# Patient Record
Sex: FEMALE | Race: WHITE | NOT HISPANIC OR LATINO | Employment: OTHER | URBAN - METROPOLITAN AREA
[De-identification: names, ages, dates, MRNs, and addresses within clinical notes are randomized per-mention and may not be internally consistent; named-entity substitution may affect disease eponyms.]

---

## 2023-03-09 LAB
ALANINE AMINOTRANSFERASE (SGPT) (U/L) IN SER/PLAS: 13 U/L (ref 7–45)
ALBUMIN (G/DL) IN SER/PLAS: 3.8 G/DL (ref 3.4–5)
ALKALINE PHOSPHATASE (U/L) IN SER/PLAS: 90 U/L (ref 33–136)
ANION GAP IN SER/PLAS: 12 MMOL/L (ref 10–20)
APPEARANCE, URINE: ABNORMAL
ASPARTATE AMINOTRANSFERASE (SGOT) (U/L) IN SER/PLAS: 21 U/L (ref 9–39)
BACTERIA, URINE: ABNORMAL /HPF
BASOPHILS (10*3/UL) IN BLOOD BY AUTOMATED COUNT: 0.05 X10E9/L (ref 0–0.1)
BASOPHILS/100 LEUKOCYTES IN BLOOD BY AUTOMATED COUNT: 0.5 % (ref 0–2)
BILIRUBIN TOTAL (MG/DL) IN SER/PLAS: 0.4 MG/DL (ref 0–1.2)
BILIRUBIN, URINE: NEGATIVE
BLOOD, URINE: NEGATIVE
CALCIDIOL (25 OH VITAMIN D3) (NG/ML) IN SER/PLAS: 16 NG/ML
CALCIUM (MG/DL) IN SER/PLAS: 8.7 MG/DL (ref 8.6–10.3)
CARBON DIOXIDE, TOTAL (MMOL/L) IN SER/PLAS: 26 MMOL/L (ref 21–32)
CHLORIDE (MMOL/L) IN SER/PLAS: 104 MMOL/L (ref 98–107)
CHOLESTEROL (MG/DL) IN SER/PLAS: 177 MG/DL (ref 0–199)
CHOLESTEROL IN HDL (MG/DL) IN SER/PLAS: 53.7 MG/DL
CHOLESTEROL/HDL RATIO: 3.3
COLOR, URINE: YELLOW
CREATININE (MG/DL) IN SER/PLAS: 1.71 MG/DL (ref 0.5–1.05)
EOSINOPHILS (10*3/UL) IN BLOOD BY AUTOMATED COUNT: 0.25 X10E9/L (ref 0–0.4)
EOSINOPHILS/100 LEUKOCYTES IN BLOOD BY AUTOMATED COUNT: 2.5 % (ref 0–6)
ERYTHROCYTE DISTRIBUTION WIDTH (RATIO) BY AUTOMATED COUNT: 15 % (ref 11.5–14.5)
ERYTHROCYTE MEAN CORPUSCULAR HEMOGLOBIN CONCENTRATION (G/DL) BY AUTOMATED: 32.7 G/DL (ref 32–36)
ERYTHROCYTE MEAN CORPUSCULAR VOLUME (FL) BY AUTOMATED COUNT: 96 FL (ref 80–100)
ERYTHROCYTES (10*6/UL) IN BLOOD BY AUTOMATED COUNT: 3.95 X10E12/L (ref 4–5.2)
GFR FEMALE: 29 ML/MIN/1.73M2
GLUCOSE (MG/DL) IN SER/PLAS: 95 MG/DL (ref 74–99)
GLUCOSE, URINE: NEGATIVE MG/DL
HEMATOCRIT (%) IN BLOOD BY AUTOMATED COUNT: 37.9 % (ref 36–46)
HEMOGLOBIN (G/DL) IN BLOOD: 12.4 G/DL (ref 12–16)
HYALINE CASTS, URINE: ABNORMAL /LPF
IMMATURE GRANULOCYTES/100 LEUKOCYTES IN BLOOD BY AUTOMATED COUNT: 0.4 % (ref 0–0.9)
KETONES, URINE: NEGATIVE MG/DL
LDL: 86 MG/DL (ref 0–99)
LEUKOCYTE ESTERASE, URINE: ABNORMAL
LEUKOCYTES (10*3/UL) IN BLOOD BY AUTOMATED COUNT: 10.2 X10E9/L (ref 4.4–11.3)
LYMPHOCYTES (10*3/UL) IN BLOOD BY AUTOMATED COUNT: 1.52 X10E9/L (ref 0.8–3)
LYMPHOCYTES/100 LEUKOCYTES IN BLOOD BY AUTOMATED COUNT: 14.9 % (ref 13–44)
MONOCYTES (10*3/UL) IN BLOOD BY AUTOMATED COUNT: 0.85 X10E9/L (ref 0.05–0.8)
MONOCYTES/100 LEUKOCYTES IN BLOOD BY AUTOMATED COUNT: 8.3 % (ref 2–10)
NEUTROPHILS (10*3/UL) IN BLOOD BY AUTOMATED COUNT: 7.48 X10E9/L (ref 1.6–5.5)
NEUTROPHILS/100 LEUKOCYTES IN BLOOD BY AUTOMATED COUNT: 73.4 % (ref 40–80)
NITRITE, URINE: NEGATIVE
PH, URINE: 5 (ref 5–8)
PLATELETS (10*3/UL) IN BLOOD AUTOMATED COUNT: 247 X10E9/L (ref 150–450)
POTASSIUM (MMOL/L) IN SER/PLAS: 4.1 MMOL/L (ref 3.5–5.3)
PROTEIN TOTAL: 5.8 G/DL (ref 6.4–8.2)
PROTEIN, URINE: ABNORMAL MG/DL
RBC, URINE: 1 /HPF (ref 0–5)
RENAL EPITHELIAL CELLS, URINE: 1 /HPF
SODIUM (MMOL/L) IN SER/PLAS: 138 MMOL/L (ref 136–145)
SPECIFIC GRAVITY, URINE: 1.01 (ref 1–1.03)
SQUAMOUS EPITHELIAL CELLS, URINE: 1 /HPF
THYROTROPIN (MIU/L) IN SER/PLAS BY DETECTION LIMIT <= 0.05 MIU/L: 2.55 MIU/L (ref 0.44–3.98)
TRIGLYCERIDE (MG/DL) IN SER/PLAS: 188 MG/DL (ref 0–149)
UREA NITROGEN (MG/DL) IN SER/PLAS: 41 MG/DL (ref 6–23)
UROBILINOGEN, URINE: <2 MG/DL (ref 0–1.9)
VLDL: 38 MG/DL (ref 0–40)
WBC, URINE: 28 /HPF (ref 0–5)

## 2023-06-27 LAB
ALANINE AMINOTRANSFERASE (SGPT) (U/L) IN SER/PLAS: 14 U/L (ref 7–45)
ALBUMIN (G/DL) IN SER/PLAS: 3.7 G/DL (ref 3.4–5)
ALKALINE PHOSPHATASE (U/L) IN SER/PLAS: 88 U/L (ref 33–136)
ANION GAP IN SER/PLAS: 11 MMOL/L (ref 10–20)
ASPARTATE AMINOTRANSFERASE (SGOT) (U/L) IN SER/PLAS: 22 U/L (ref 9–39)
BILIRUBIN TOTAL (MG/DL) IN SER/PLAS: 0.3 MG/DL (ref 0–1.2)
CALCIUM (MG/DL) IN SER/PLAS: 8.1 MG/DL (ref 8.6–10.3)
CARBON DIOXIDE, TOTAL (MMOL/L) IN SER/PLAS: 24 MMOL/L (ref 21–32)
CHLORIDE (MMOL/L) IN SER/PLAS: 105 MMOL/L (ref 98–107)
CREATININE (MG/DL) IN SER/PLAS: 2.01 MG/DL (ref 0.5–1.05)
GFR FEMALE: 24 ML/MIN/1.73M2
GLUCOSE (MG/DL) IN SER/PLAS: 94 MG/DL (ref 74–99)
IRON (UG/DL) IN SER/PLAS: 57 UG/DL (ref 35–150)
IRON BINDING CAPACITY (UG/DL) IN SER/PLAS: 266 UG/DL (ref 240–445)
IRON SATURATION (%) IN SER/PLAS: 21 % (ref 25–45)
MAGNESIUM (MG/DL) IN SER/PLAS: 2.38 MG/DL (ref 1.6–2.4)
POTASSIUM (MMOL/L) IN SER/PLAS: 4.1 MMOL/L (ref 3.5–5.3)
PROTEIN TOTAL: 5.6 G/DL (ref 6.4–8.2)
SODIUM (MMOL/L) IN SER/PLAS: 136 MMOL/L (ref 136–145)
UREA NITROGEN (MG/DL) IN SER/PLAS: 50 MG/DL (ref 6–23)

## 2023-06-28 LAB — PARATHYRIN INTACT (PG/ML) IN SER/PLAS: 70.6 PG/ML (ref 18.5–88)

## 2023-06-29 LAB
COLLECTION DURATION OF URINE: 24 HRS
CREATININE (MG/24HR) IN 24 HOUR URINE: 0.68 G/24H (ref 0.67–1.59)
CREATININE (MG/DL) IN URINE: 45.4 MG/DL (ref 20–320)
PROTEIN (MG/24HR) IN 24 HOUR URINE: 3135 MG/24H (ref 0–149)
PROTEIN URINE: 209 MG/DL
VOLUME OF URINE: 1500 ML

## 2023-06-30 LAB — VITAMIN D 1,25-DIHYDROXY: 29.2 PG/ML (ref 19.9–79.3)

## 2023-10-06 DIAGNOSIS — R19.7 DIARRHEA, UNSPECIFIED TYPE: ICD-10-CM

## 2023-10-06 DIAGNOSIS — I10 PRIMARY HYPERTENSION: Primary | ICD-10-CM

## 2023-10-06 DIAGNOSIS — F32.0 CURRENT MILD EPISODE OF MAJOR DEPRESSIVE DISORDER WITHOUT PRIOR EPISODE (CMS-HCC): ICD-10-CM

## 2023-10-06 RX ORDER — DOXAZOSIN 1 MG/1
1 TABLET ORAL NIGHTLY
COMMUNITY
Start: 2021-06-29 | End: 2023-10-06 | Stop reason: SDUPTHER

## 2023-10-06 RX ORDER — AMLODIPINE BESYLATE 5 MG/1
5 TABLET ORAL DAILY
COMMUNITY
Start: 2022-09-01 | End: 2023-10-06 | Stop reason: SDUPTHER

## 2023-10-06 RX ORDER — ESCITALOPRAM OXALATE 10 MG/1
10 TABLET ORAL DAILY
COMMUNITY
Start: 2023-02-23 | End: 2023-10-06 | Stop reason: SDUPTHER

## 2023-10-06 NOTE — TELEPHONE ENCOUNTER
Pt called requesting refills on pended meds.  Pt has no upcoming OV scheduled.  Pt states she is going out of town in 2 weeks and only has 2 week supply of meds left.  OK for refill? Thanks, CG

## 2023-10-11 RX ORDER — METOPROLOL SUCCINATE 25 MG/1
25 TABLET, EXTENDED RELEASE ORAL DAILY
Qty: 100 TABLET | Refills: 1 | Status: SHIPPED | OUTPATIENT
Start: 2023-10-11 | End: 2023-11-20 | Stop reason: ALTCHOICE

## 2023-10-11 RX ORDER — CHOLESTYRAMINE 4 G/9G
1 POWDER, FOR SUSPENSION ORAL DAILY
Qty: 100 PACKET | Refills: 1 | Status: SHIPPED | OUTPATIENT
Start: 2023-10-11 | End: 2023-11-28 | Stop reason: WASHOUT

## 2023-10-11 RX ORDER — DOXAZOSIN 1 MG/1
1 TABLET ORAL NIGHTLY
Qty: 100 TABLET | Refills: 1 | Status: SHIPPED | OUTPATIENT
Start: 2023-10-11 | End: 2024-04-28

## 2023-10-11 RX ORDER — FUROSEMIDE 20 MG/1
20 TABLET ORAL DAILY
COMMUNITY
Start: 2023-10-03 | End: 2023-11-20 | Stop reason: ALTCHOICE

## 2023-10-11 RX ORDER — ESCITALOPRAM OXALATE 10 MG/1
10 TABLET ORAL DAILY
Qty: 100 TABLET | Refills: 1 | Status: SHIPPED | OUTPATIENT
Start: 2023-10-11 | End: 2024-02-23

## 2023-10-11 RX ORDER — METOPROLOL SUCCINATE 25 MG/1
25 TABLET, EXTENDED RELEASE ORAL DAILY
COMMUNITY
Start: 2023-09-01 | End: 2023-10-11 | Stop reason: SDUPTHER

## 2023-10-11 RX ORDER — CHOLESTYRAMINE 4 G/9G
1 POWDER, FOR SUSPENSION ORAL DAILY
COMMUNITY
End: 2023-10-11 | Stop reason: SDUPTHER

## 2023-10-11 RX ORDER — AMLODIPINE BESYLATE 5 MG/1
5 TABLET ORAL DAILY
Qty: 100 TABLET | Refills: 1 | Status: SHIPPED | OUTPATIENT
Start: 2023-10-11 | End: 2023-11-20 | Stop reason: ALTCHOICE

## 2023-10-11 RX ORDER — VALSARTAN AND HYDROCHLOROTHIAZIDE 320; 25 MG/1; MG/1
1 TABLET, FILM COATED ORAL DAILY
COMMUNITY
Start: 2023-07-29 | End: 2023-10-11 | Stop reason: SDUPTHER

## 2023-10-11 RX ORDER — HYDRALAZINE HYDROCHLORIDE 25 MG/1
25 TABLET, FILM COATED ORAL 3 TIMES DAILY
Qty: 300 TABLET | Refills: 1 | Status: SHIPPED | OUTPATIENT
Start: 2023-10-11 | End: 2023-11-20 | Stop reason: ALTCHOICE

## 2023-10-11 RX ORDER — HYDRALAZINE HYDROCHLORIDE 25 MG/1
25 TABLET, FILM COATED ORAL 3 TIMES DAILY
COMMUNITY
Start: 2022-07-19 | End: 2023-10-11 | Stop reason: SDUPTHER

## 2023-10-11 RX ORDER — VALSARTAN AND HYDROCHLOROTHIAZIDE 320; 25 MG/1; MG/1
1 TABLET, FILM COATED ORAL DAILY
Qty: 100 TABLET | Refills: 1 | Status: SHIPPED | OUTPATIENT
Start: 2023-10-11 | End: 2023-11-20 | Stop reason: ALTCHOICE

## 2023-11-07 ENCOUNTER — TELEPHONE (OUTPATIENT)
Dept: PRIMARY CARE | Facility: CLINIC | Age: 83
End: 2023-11-07
Payer: MEDICARE

## 2023-11-07 NOTE — TELEPHONE ENCOUNTER
PATIENT HAD APPOINTMENT TODAY WITH NEPHROLOGIST DR. GOLDBERG AND WAS TOLD TO ASK YOU IF YOU COULD CALL [facility] FOR A DIRECT ADMISSION FOR A RIGHT KIDNEY BIOPSY.   SHE SAYS SHE TRIED GOING THRU ER BUT THEY COULD NOT REGULATE HER BLOOD PRESSURE SO SHE NEEDS A DIRECT ADMISSION.

## 2023-11-20 ENCOUNTER — OFFICE VISIT (OUTPATIENT)
Dept: PRIMARY CARE | Facility: CLINIC | Age: 83
End: 2023-11-20
Payer: MEDICARE

## 2023-11-20 VITALS
SYSTOLIC BLOOD PRESSURE: 126 MMHG | DIASTOLIC BLOOD PRESSURE: 80 MMHG | HEIGHT: 65 IN | WEIGHT: 156 LBS | HEART RATE: 68 BPM | BODY MASS INDEX: 25.99 KG/M2

## 2023-11-20 DIAGNOSIS — I12.9 CKD STAGE 4 SECONDARY TO HYPERTENSION (MULTI): Primary | ICD-10-CM

## 2023-11-20 DIAGNOSIS — C7A.019 MALIGNANT CARCINOID TUMOR OF SMALL INTESTINE, UNSPECIFIED LOCATION (MULTI): ICD-10-CM

## 2023-11-20 DIAGNOSIS — N18.4 CKD STAGE 4 SECONDARY TO HYPERTENSION (MULTI): Primary | ICD-10-CM

## 2023-11-20 DIAGNOSIS — I10 BENIGN ESSENTIAL HYPERTENSION: ICD-10-CM

## 2023-11-20 PROCEDURE — 1036F TOBACCO NON-USER: CPT | Performed by: INTERNAL MEDICINE

## 2023-11-20 PROCEDURE — 1159F MED LIST DOCD IN RCRD: CPT | Performed by: INTERNAL MEDICINE

## 2023-11-20 PROCEDURE — 3074F SYST BP LT 130 MM HG: CPT | Performed by: INTERNAL MEDICINE

## 2023-11-20 PROCEDURE — 99214 OFFICE O/P EST MOD 30 MIN: CPT | Performed by: INTERNAL MEDICINE

## 2023-11-20 PROCEDURE — 3079F DIAST BP 80-89 MM HG: CPT | Performed by: INTERNAL MEDICINE

## 2023-11-20 RX ORDER — ACETAMINOPHEN 325 MG/1
650 TABLET ORAL EVERY 6 HOURS PRN
COMMUNITY
Start: 2023-11-10 | End: 2023-11-20

## 2023-11-20 RX ORDER — OCTREOTIDE ACETATE 50 UG/ML
50 INJECTION, SOLUTION INTRAVENOUS; SUBCUTANEOUS
COMMUNITY

## 2023-11-20 RX ORDER — DAPAGLIFLOZIN 5 MG/1
5 TABLET, FILM COATED ORAL DAILY
COMMUNITY

## 2023-11-20 RX ORDER — NIFEDIPINE 60 MG/1
60 TABLET, FILM COATED, EXTENDED RELEASE ORAL
COMMUNITY
End: 2023-11-28 | Stop reason: WASHOUT

## 2023-11-20 RX ORDER — VALSARTAN 80 MG/1
80 TABLET ORAL 2 TIMES DAILY
COMMUNITY
Start: 2023-11-10 | End: 2023-12-10

## 2023-11-20 RX ORDER — CLONIDINE HYDROCHLORIDE 0.1 MG/1
0.1 TABLET ORAL AS NEEDED
COMMUNITY

## 2023-11-20 RX ORDER — DAPAGLIFLOZIN 10 MG/1
TABLET, FILM COATED ORAL
COMMUNITY
End: 2023-11-20 | Stop reason: ALTCHOICE

## 2023-11-20 NOTE — PROGRESS NOTES
Primary Care Physician: Vance Bagley MD    Date of Visit: 11/20/2023      Summary:   Missy Kirk is a 83 y.o. female presents hsopital f/up    Chief Complaint:   Chief Complaint   Patient presents with    Hospital Follow-up         HPI:  HPI  Pt has ckd.  Referred to nephro.  Tried to do a renal bx, but, her bp kept going up.    She was admitted to the hospital for a more controlled setting.  Post disch,  she is on a renal diet (not much that she can have that she actually likes to eat) and checking bp 4x/day!    Many medication changes  Results of the bx are not back yet.     Dwp and grand-daughter  Recommended that she have a no added salt (ie, season food when cooking, but try not to add after)  There are foods that have high potassium.  She can eat them in moderation   Stop checking bp so much.  Once a day is more than enough.  A couple of times a week would be better.   Past Medical History:  Past Medical History:   Diagnosis Date    Acute upper respiratory infection, unspecified     Upper respiratory infection of multiple sites    Chronic maxillary sinusitis     Sinusitis, maxillary, chronic    Other specified disorders of bone, unspecified site     Exostosis    Other specified postprocedural states     History of cardiac cath    Personal history of diseases of the skin and subcutaneous tissue 04/16/2015    History of cyst of breast    Personal history of other diseases of the musculoskeletal system and connective tissue     History of myositis    Personal history of other diseases of the nervous system and sense organs     History of impacted cerumen    Personal history of other diseases of the respiratory system     History of acute pharyngitis    Personal history of other specified conditions     History of abnormal weight loss    Personal history of pneumonia (recurrent) 01/09/2018    History of pneumonia    Personal history of urinary (tract) infections     History of urinary tract infection     "Right upper quadrant pain     Abdominal pain, RUQ (right upper quadrant)    Sprain of ligaments of thoracic spine, initial encounter     Thoracic sprain and strain    Unspecified abnormal findings in urine     Nonspecific findings on examination of urine        Social History:   reports that she has never smoked. She has never been exposed to tobacco smoke. She has never used smokeless tobacco. She reports that she does not currently use alcohol. She reports that she does not use drugs.     Family History:  family history is not on file.      Allergies:  Allergies   Allergen Reactions    Codeine Unknown     GI upset    Fentanyl Unknown and Other    Oxycodone Unknown    Verapamil Unknown       Outpatient Medications:  Current Outpatient Medications   Medication Instructions    cholestyramine (Questran) 4 gram packet 4 g, oral, Daily    cloNIDine (CATAPRES) 0.1 mg, oral, As needed, Take if BP exceeds 160    dapagliflozin propanediol (FARXIGA) 5 mg, oral, Daily    doxazosin (CARDURA) 1 mg, oral, Nightly    escitalopram (LEXAPRO) 10 mg, oral, Daily    NIFEdipine ER (ADALAT CC) 60 mg, oral, Once daily (morning) M-F (5 days a week), Take at noon <BR>Do not crush, chew, or split.    SandoSTATIN 50 mcg, subcutaneous, Every 30 days    valsartan (DIOVAN) 80 mg, oral, 2 times daily         ROS:   Review of Systems   All other systems reviewed and are negative.         Vitals:    11/20/23 0848   BP: 126/80   BP Location: Left arm   Patient Position: Sitting   BP Cuff Size: Adult   Pulse: 68   Weight: 70.8 kg (156 lb)   Height: 1.651 m (5' 5\")     Wt Readings from Last 1 Encounters:   11/20/23 70.8 kg (156 lb)     Body mass index is 25.96 kg/m².       Physical Exam:  Physical Exam  HENT:      Head: Normocephalic and atraumatic.      Nose: Nose normal.      Mouth/Throat:      Mouth: Mucous membranes are moist.   Eyes:      Conjunctiva/sclera: Conjunctivae normal.      Pupils: Pupils are equal, round, and reactive to light. "   Cardiovascular:      Rate and Rhythm: Normal rate and regular rhythm.      Heart sounds: Normal heart sounds.   Pulmonary:      Effort: Pulmonary effort is normal.      Breath sounds: Normal breath sounds.   Abdominal:      General: Bowel sounds are normal.      Palpations: Abdomen is soft.   Musculoskeletal:      Cervical back: Neck supple.      Right lower leg: No edema.      Left lower leg: No edema.   Neurological:      Mental Status: She is alert.               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       Followup Appts:  No future appointments.        ____________________________________________________________  Vance Bagley MD

## 2023-11-21 NOTE — PROGRESS NOTES
Subjective   Patient ID: Missy Kirk is a 83 y.o. female who presents for No chief complaint on file..  HPI    Review of Systems    Objective   Physical Exam    Assessment/Plan   {Assess/PlanSmartLinks:12747}

## 2023-11-28 PROBLEM — F41.1 GENERALIZED ANXIETY DISORDER: Status: ACTIVE | Noted: 2023-11-28

## 2023-11-28 PROBLEM — I10 HTN (HYPERTENSION), MALIGNANT: Status: ACTIVE | Noted: 2023-11-09

## 2023-11-28 PROBLEM — I12.9 CKD STAGE 4 SECONDARY TO HYPERTENSION (MULTI): Status: ACTIVE | Noted: 2023-11-08

## 2023-11-28 PROBLEM — I10 BENIGN ESSENTIAL HYPERTENSION: Status: ACTIVE | Noted: 2020-12-17

## 2023-11-28 PROBLEM — R19.7 DIARRHEA: Status: ACTIVE | Noted: 2023-11-28

## 2023-11-28 PROBLEM — R53.83 FATIGUE: Status: RESOLVED | Noted: 2023-11-28 | Resolved: 2023-11-28

## 2023-11-28 PROBLEM — E78.5 HYPERLIPIDEMIA: Status: ACTIVE | Noted: 2023-11-28

## 2023-11-28 PROBLEM — D64.9 ANEMIA: Status: ACTIVE | Noted: 2023-11-08

## 2023-11-28 PROBLEM — M85.80 OSTEOPENIA: Status: ACTIVE | Noted: 2023-11-28

## 2023-11-28 PROBLEM — A04.72 C. DIFFICILE DIARRHEA: Status: RESOLVED | Noted: 2023-11-28 | Resolved: 2023-11-28

## 2023-11-28 PROBLEM — R32 URINARY INCONTINENCE: Status: ACTIVE | Noted: 2023-11-28

## 2023-11-28 PROBLEM — C7A.019: Status: ACTIVE | Noted: 2023-11-28

## 2023-11-28 PROBLEM — R10.84 GENERALIZED ABDOMINAL PAIN: Status: RESOLVED | Noted: 2020-12-17 | Resolved: 2023-11-28

## 2023-11-28 PROBLEM — N18.4 CKD STAGE 4 SECONDARY TO HYPERTENSION (MULTI): Status: ACTIVE | Noted: 2023-11-08

## 2023-11-28 PROBLEM — K58.0 IRRITABLE BOWEL SYNDROME WITH DIARRHEA: Status: ACTIVE | Noted: 2023-11-28

## 2023-11-28 PROBLEM — N32.81 OVERACTIVE BLADDER: Status: ACTIVE | Noted: 2020-12-17

## 2023-11-28 RX ORDER — CARVEDILOL 12.5 MG/1
12.5 TABLET ORAL
COMMUNITY

## 2023-11-28 RX ORDER — SPIRONOLACTONE 25 MG/1
25 TABLET ORAL DAILY
COMMUNITY

## 2023-11-28 RX ORDER — NIFEDIPINE 90 MG/1
90 TABLET, EXTENDED RELEASE ORAL DAILY
COMMUNITY

## 2023-11-28 RX ORDER — HYDRALAZINE HYDROCHLORIDE 100 MG/1
100 TABLET, FILM COATED ORAL 3 TIMES DAILY
COMMUNITY

## 2023-11-28 RX ORDER — FUROSEMIDE 20 MG/1
20 TABLET ORAL DAILY
COMMUNITY

## 2023-11-28 RX ORDER — SODIUM BICARBONATE 650 MG/1
650 TABLET ORAL 3 TIMES DAILY
COMMUNITY

## 2024-02-17 DIAGNOSIS — F32.0 CURRENT MILD EPISODE OF MAJOR DEPRESSIVE DISORDER WITHOUT PRIOR EPISODE (CMS-HCC): ICD-10-CM

## 2024-02-23 RX ORDER — ESCITALOPRAM OXALATE 10 MG/1
10 TABLET ORAL DAILY
Qty: 100 TABLET | Refills: 1 | Status: SHIPPED | OUTPATIENT
Start: 2024-02-23 | End: 2024-09-10

## 2024-05-01 ENCOUNTER — OFFICE VISIT (OUTPATIENT)
Dept: PODIATRY | Facility: CLINIC | Age: 84
End: 2024-05-01
Payer: MEDICARE

## 2024-05-01 DIAGNOSIS — M79.674 TOE PAIN, RIGHT: ICD-10-CM

## 2024-05-01 DIAGNOSIS — B35.1 TINEA UNGUIUM: Primary | ICD-10-CM

## 2024-05-01 DIAGNOSIS — M79.675 TOE PAIN, LEFT: ICD-10-CM

## 2024-05-01 PROCEDURE — 1157F ADVNC CARE PLAN IN RCRD: CPT | Performed by: PODIATRIST

## 2024-05-01 PROCEDURE — 11721 DEBRIDE NAIL 6 OR MORE: CPT | Performed by: PODIATRIST

## 2024-05-02 NOTE — PROGRESS NOTES
CC:  painful thickened and elongated toenails    HPI:  This presents complaining  painful thickened and elongated toenails that are difficult to manage.  Onset was gradual with worsening course until recently.  Aggravated by shoe gear and ambulation.       PCP: Dr. Bagley  Last visit: 4-3-24     PMH  Past Medical History:   Diagnosis Date    Acute upper respiratory infection, unspecified     Upper respiratory infection of multiple sites    C. difficile diarrhea 11/28/2023    Chronic maxillary sinusitis     Sinusitis, maxillary, chronic    Fatigue 11/28/2023    Generalized abdominal pain 12/17/2020    Other specified disorders of bone, unspecified site     Exostosis    Other specified postprocedural states     History of cardiac cath    Personal history of diseases of the skin and subcutaneous tissue 04/16/2015    History of cyst of breast    Personal history of other diseases of the musculoskeletal system and connective tissue     History of myositis    Personal history of other diseases of the nervous system and sense organs     History of impacted cerumen    Personal history of other diseases of the respiratory system     History of acute pharyngitis    Personal history of other specified conditions     History of abnormal weight loss    Personal history of pneumonia (recurrent) 01/09/2018    History of pneumonia    Personal history of urinary (tract) infections     History of urinary tract infection    Right upper quadrant pain     Abdominal pain, RUQ (right upper quadrant)    Sprain of ligaments of thoracic spine, initial encounter     Thoracic sprain and strain    Unspecified abnormal findings in urine     Nonspecific findings on examination of urine     MEDS    Current Outpatient Medications:     carvedilol (Coreg) 12.5 mg tablet, Take 1 tablet (12.5 mg) by mouth 2 times a day with meals., Disp: , Rfl:     cloNIDine (Catapres) 0.1 mg tablet, Take 1 tablet (0.1 mg) by mouth if needed for high blood pressure.  Take if BP exceeds 160, Disp: , Rfl:     dapagliflozin propanediol (Farxiga) 5 mg, Take 1 tablet (5 mg) by mouth once daily., Disp: , Rfl:     doxazosin (Cardura) 1 mg tablet, Take 1 tablet (1 mg) by mouth once daily at bedtime., Disp: 100 tablet, Rfl: 1    escitalopram (Lexapro) 10 mg tablet, Take 1 tablet (10 mg) by mouth once daily., Disp: 100 tablet, Rfl: 1    furosemide (Lasix) 20 mg tablet, Take 1 tablet (20 mg) by mouth once daily., Disp: , Rfl:     hydrALAZINE (Apresoline) 100 mg tablet, Take 1 tablet (100 mg) by mouth 3 times a day., Disp: , Rfl:     NIFEdipine ER (Adalat CC) 90 mg 24 hr tablet, Take 1 tablet (90 mg) by mouth once daily. Do not crush, chew, or split., Disp: , Rfl:     octreotide (SandoSTATIN) 50 mcg/mL solution injection, Inject 1 mL (50 mcg) under the skin every 30 (thirty) days., Disp: , Rfl:     sodium bicarbonate 650 mg tablet, Take 1 tablet (650 mg) by mouth 3 times a day., Disp: , Rfl:     spironolactone (Aldactone) 25 mg tablet, Take 1 tablet (25 mg) by mouth once daily., Disp: , Rfl:     valsartan (Diovan) 80 mg tablet, Take 1 tablet (80 mg) by mouth twice a day., Disp: , Rfl:   Allergies  Allergies   Allergen Reactions    Codeine Unknown     GI upset    Fentanyl Unknown and Other    Oxycodone Unknown    Verapamil Unknown     Social History     Socioeconomic History    Marital status:      Spouse name: Not on file    Number of children: Not on file    Years of education: Not on file    Highest education level: Not on file   Occupational History    Not on file   Tobacco Use    Smoking status: Never     Passive exposure: Never    Smokeless tobacco: Never   Substance and Sexual Activity    Alcohol use: Not Currently    Drug use: Never    Sexual activity: Not on file   Other Topics Concern    Not on file   Social History Narrative    Not on file     Social Determinants of Health     Financial Resource Strain: Low Risk  (11/9/2023)    Received from [facility]    Overall  Financial Resource Strain (CARDIA)     Difficulty of Paying Living Expenses: Not hard at all   Food Insecurity: No Food Insecurity (11/9/2023)    Received from [facility]    Hunger Vital Sign     Worried About Running Out of Food in the Last Year: Never true     Ran Out of Food in the Last Year: Never true   Transportation Needs: Unknown (11/9/2023)    Received from [facility]    PRAPARE - Transportation     Lack of Transportation (Medical): No     Lack of Transportation (Non-Medical): Not on file   Physical Activity: Not on file   Stress: Not on file   Social Connections: Not on file   Intimate Partner Violence: Not on file   Housing Stability: Unknown (11/9/2023)    Received from [facility]    Housing Stability Vital Sign     Unable to Pay for Housing in the Last Year: No     Number of Places Lived in the Last Year: Not on file     Unstable Housing in the Last Year: No     No family history on file.  Past Surgical History:   Procedure Laterality Date    APPENDECTOMY  04/15/2015    Appendectomy    CT GUIDED CHEST TUBE PLACEMENT  11/21/2017    CT GUIDED CHEST TUBE PLACEMENT 11/21/2017 [facility]    TONSILLECTOMY  04/15/2015    Tonsillectomy    US GUIDED THYROID BIOPSY  3/25/2022    US GUIDED THYROID BIOPSY 3/25/2022 [facility]       REVIEW OF SYSTEMS    DERM:   + as noted in HPI.       Physical examination:   On General Observation: Patient is a pleasant, cooperative, well developed 83 y.o.  adult female. The patient is alert and oriented to time, place and person. Patient has normal affect and mood.  There were no vitals taken for this visit.    Vascular:  DP and PT pulses are 1-2/4 b/l.  mild edema noted. mild varicosities b/l.  CFT  6 seconds to all digits bilateral.  Skin temperature is warm to warm from proximal to distal bilateral.      Muscular: Strength is 5/5 for all instrinsic and extrinsic muscle groups.     Neuro:  Proprioception present.   Sensation to vibration is   present. Protective sensation present  at all pedal sites via [place] 5.07 monofilament bilateral.  Light touch intact bilateral.     Derm:    Decreased hair growth b/l le  Left toenails: 1-5 Brittleness, crumbling upon debridement, subungual debris, elongation, mycotic appearance, tenderness, and thickness.   Right toenails: 1-5 Brittleness, crumbling upon debridement, subungual debris, elongation, mycotic appearance, tenderness, and thickness.       ASSESSMENT:    Tinea Unguium [B35.1]   Pain in right toe(s) [M79.674]   Pain in left toe(s) [M79.675]       PLAN:   A comprehensive history and physical examination were preformed. The patient was educated on clinical findings, diagnosis and treatment plans. Patient understands all that has been explained and all questions were answered to apparent satisfaction.     - Debrided toenails 1-10 in length and height.   - Follow up in 9-12 weeks.       Jeremiah Mitchell DPM

## 2024-07-18 ENCOUNTER — APPOINTMENT (OUTPATIENT)
Dept: PRIMARY CARE | Facility: CLINIC | Age: 84
End: 2024-07-18
Payer: MEDICARE

## 2024-07-18 ENCOUNTER — LAB (OUTPATIENT)
Dept: LAB | Facility: LAB | Age: 84
End: 2024-07-18
Payer: MEDICARE

## 2024-07-18 VITALS
HEART RATE: 66 BPM | BODY MASS INDEX: 27.23 KG/M2 | HEIGHT: 62 IN | SYSTOLIC BLOOD PRESSURE: 132 MMHG | WEIGHT: 148 LBS | DIASTOLIC BLOOD PRESSURE: 80 MMHG | OXYGEN SATURATION: 97 %

## 2024-07-18 DIAGNOSIS — D64.9 ANEMIA, UNSPECIFIED TYPE: ICD-10-CM

## 2024-07-18 DIAGNOSIS — Z00.00 ENCOUNTER FOR MEDICARE ANNUAL WELLNESS EXAM: ICD-10-CM

## 2024-07-18 DIAGNOSIS — Z13.1 SCREENING FOR DIABETES MELLITUS: ICD-10-CM

## 2024-07-18 DIAGNOSIS — Z99.2 ESRD (END STAGE RENAL DISEASE) ON DIALYSIS (MULTI): ICD-10-CM

## 2024-07-18 DIAGNOSIS — I35.1 AORTIC VALVE INSUFFICIENCY, ETIOLOGY OF CARDIAC VALVE DISEASE UNSPECIFIED: ICD-10-CM

## 2024-07-18 DIAGNOSIS — E55.9 VITAMIN D DEFICIENCY: ICD-10-CM

## 2024-07-18 DIAGNOSIS — Z13.29 SCREENING FOR THYROID DISORDER: ICD-10-CM

## 2024-07-18 DIAGNOSIS — I12.9 CKD STAGE 4 SECONDARY TO HYPERTENSION (MULTI): Primary | ICD-10-CM

## 2024-07-18 DIAGNOSIS — R73.9 HYPERGLYCEMIA: ICD-10-CM

## 2024-07-18 DIAGNOSIS — I10 ESSENTIAL (PRIMARY) HYPERTENSION: ICD-10-CM

## 2024-07-18 DIAGNOSIS — N18.4 CKD STAGE 4 SECONDARY TO HYPERTENSION (MULTI): Primary | ICD-10-CM

## 2024-07-18 DIAGNOSIS — C7A.019 MALIGNANT CARCINOID TUMOR OF SMALL INTESTINE, UNSPECIFIED LOCATION (MULTI): ICD-10-CM

## 2024-07-18 DIAGNOSIS — N18.6 ESRD (END STAGE RENAL DISEASE) ON DIALYSIS (MULTI): ICD-10-CM

## 2024-07-18 DIAGNOSIS — Z00.00 ANNUAL PHYSICAL EXAM: ICD-10-CM

## 2024-07-18 DIAGNOSIS — E78.2 MIXED HYPERLIPIDEMIA: ICD-10-CM

## 2024-07-18 PROBLEM — R19.7 DIARRHEA: Status: RESOLVED | Noted: 2023-11-28 | Resolved: 2024-07-18

## 2024-07-18 LAB
25(OH)D3 SERPL-MCNC: 26 NG/ML (ref 30–100)
ALBUMIN SERPL BCP-MCNC: 3.7 G/DL (ref 3.4–5)
ALP SERPL-CCNC: 90 U/L (ref 33–136)
ALT SERPL W P-5'-P-CCNC: 14 U/L (ref 7–45)
ANION GAP SERPL CALC-SCNC: 13 MMOL/L (ref 10–20)
APPEARANCE UR: CLEAR
AST SERPL W P-5'-P-CCNC: 22 U/L (ref 9–39)
BASOPHILS # BLD AUTO: 0.06 X10*3/UL (ref 0–0.1)
BASOPHILS NFR BLD AUTO: 0.6 %
BILIRUB SERPL-MCNC: 0.5 MG/DL (ref 0–1.2)
BILIRUB UR STRIP.AUTO-MCNC: NEGATIVE MG/DL
BUN SERPL-MCNC: 35 MG/DL (ref 6–23)
CALCIUM SERPL-MCNC: 8.5 MG/DL (ref 8.6–10.3)
CHLORIDE SERPL-SCNC: 102 MMOL/L (ref 98–107)
CHOLEST SERPL-MCNC: 117 MG/DL (ref 0–199)
CHOLESTEROL/HDL RATIO: 1.9
CO2 SERPL-SCNC: 26 MMOL/L (ref 21–32)
COLOR UR: YELLOW
CREAT SERPL-MCNC: 4.11 MG/DL (ref 0.5–1.05)
EGFRCR SERPLBLD CKD-EPI 2021: 10 ML/MIN/1.73M*2
EOSINOPHIL # BLD AUTO: 0.08 X10*3/UL (ref 0–0.4)
EOSINOPHIL NFR BLD AUTO: 0.8 %
ERYTHROCYTE [DISTWIDTH] IN BLOOD BY AUTOMATED COUNT: 17.2 % (ref 11.5–14.5)
GLUCOSE SERPL-MCNC: 102 MG/DL (ref 74–99)
GLUCOSE UR STRIP.AUTO-MCNC: NORMAL MG/DL
HCT VFR BLD AUTO: 33.6 % (ref 36–46)
HDLC SERPL-MCNC: 62.9 MG/DL
HGB BLD-MCNC: 10.7 G/DL (ref 12–16)
IMM GRANULOCYTES # BLD AUTO: 0.04 X10*3/UL (ref 0–0.5)
IMM GRANULOCYTES NFR BLD AUTO: 0.4 % (ref 0–0.9)
KETONES UR STRIP.AUTO-MCNC: NEGATIVE MG/DL
LDLC SERPL CALC-MCNC: 29 MG/DL
LEUKOCYTE ESTERASE UR QL STRIP.AUTO: NEGATIVE
LYMPHOCYTES # BLD AUTO: 1.32 X10*3/UL (ref 0.8–3)
LYMPHOCYTES NFR BLD AUTO: 13 %
MCH RBC QN AUTO: 31.2 PG (ref 26–34)
MCHC RBC AUTO-ENTMCNC: 31.8 G/DL (ref 32–36)
MCV RBC AUTO: 98 FL (ref 80–100)
MONOCYTES # BLD AUTO: 0.71 X10*3/UL (ref 0.05–0.8)
MONOCYTES NFR BLD AUTO: 7 %
MUCOUS THREADS #/AREA URNS AUTO: NORMAL /LPF
NEUTROPHILS # BLD AUTO: 7.98 X10*3/UL (ref 1.6–5.5)
NEUTROPHILS NFR BLD AUTO: 78.2 %
NITRITE UR QL STRIP.AUTO: NEGATIVE
NON HDL CHOLESTEROL: 54 MG/DL (ref 0–149)
NRBC BLD-RTO: 0 /100 WBCS (ref 0–0)
PH UR STRIP.AUTO: 6 [PH]
PLATELET # BLD AUTO: 252 X10*3/UL (ref 150–450)
POTASSIUM SERPL-SCNC: 4.4 MMOL/L (ref 3.5–5.3)
PROT SERPL-MCNC: 5.8 G/DL (ref 6.4–8.2)
PROT UR STRIP.AUTO-MCNC: ABNORMAL MG/DL
RBC # BLD AUTO: 3.43 X10*6/UL (ref 4–5.2)
RBC # UR STRIP.AUTO: NEGATIVE /UL
RBC #/AREA URNS AUTO: NORMAL /HPF
SODIUM SERPL-SCNC: 137 MMOL/L (ref 136–145)
SP GR UR STRIP.AUTO: 1.02
TRIGL SERPL-MCNC: 128 MG/DL (ref 0–149)
TSH SERPL-ACNC: 2.68 MIU/L (ref 0.44–3.98)
UROBILINOGEN UR STRIP.AUTO-MCNC: NORMAL MG/DL
VLDL: 26 MG/DL (ref 0–40)
WBC # BLD AUTO: 10.2 X10*3/UL (ref 4.4–11.3)
WBC #/AREA URNS AUTO: NORMAL /HPF

## 2024-07-18 PROCEDURE — G0439 PPPS, SUBSEQ VISIT: HCPCS | Performed by: INTERNAL MEDICINE

## 2024-07-18 PROCEDURE — 3075F SYST BP GE 130 - 139MM HG: CPT | Performed by: INTERNAL MEDICINE

## 2024-07-18 PROCEDURE — 1159F MED LIST DOCD IN RCRD: CPT | Performed by: INTERNAL MEDICINE

## 2024-07-18 PROCEDURE — 1170F FXNL STATUS ASSESSED: CPT | Performed by: INTERNAL MEDICINE

## 2024-07-18 PROCEDURE — 3079F DIAST BP 80-89 MM HG: CPT | Performed by: INTERNAL MEDICINE

## 2024-07-18 PROCEDURE — 1157F ADVNC CARE PLAN IN RCRD: CPT | Performed by: INTERNAL MEDICINE

## 2024-07-18 PROCEDURE — 99397 PER PM REEVAL EST PAT 65+ YR: CPT | Performed by: INTERNAL MEDICINE

## 2024-07-18 PROCEDURE — 36415 COLL VENOUS BLD VENIPUNCTURE: CPT

## 2024-07-18 PROCEDURE — 1158F ADVNC CARE PLAN TLK DOCD: CPT | Performed by: INTERNAL MEDICINE

## 2024-07-18 PROCEDURE — 1123F ACP DISCUSS/DSCN MKR DOCD: CPT | Performed by: INTERNAL MEDICINE

## 2024-07-18 PROCEDURE — 99214 OFFICE O/P EST MOD 30 MIN: CPT | Performed by: INTERNAL MEDICINE

## 2024-07-18 RX ORDER — CALCITRIOL 0.25 UG/1
CAPSULE ORAL
COMMUNITY
Start: 2024-06-26

## 2024-07-18 RX ORDER — RENO CAPS 100; 1.5; 1.7; 20; 10; 1; 150; 5; 6 MG/1; MG/1; MG/1; MG/1; MG/1; MG/1; UG/1; MG/1; UG/1
1 CAPSULE ORAL
COMMUNITY
Start: 2024-07-12

## 2024-07-18 ASSESSMENT — ACTIVITIES OF DAILY LIVING (ADL)
DOING HOUSEWORK: INDEPENDENT
ADEQUATE_TO_COMPLETE_ADL: YES
DOING_HOUSEWORK: INDEPENDENT
MANAGING_FINANCES: INDEPENDENT
TOILETING: INDEPENDENT
NEEDS ASSISTANCE WITH FOOD: INDEPENDENT
FEEDING YOURSELF: INDEPENDENT
USING TELEPHONE: INDEPENDENT
STIL DRIVING: YES
DRESSING: INDEPENDENT
MANAGING FINANCES: INDEPENDENT
GROOMING: INDEPENDENT
EATING: INDEPENDENT
JUDGMENT_ADEQUATE_SAFELY_COMPLETE_DAILY_ACTIVITIES: YES
TAKING_MEDICATION: INDEPENDENT
GROCERY SHOPPING: INDEPENDENT
TAKING MEDICATION: INDEPENDENT
PATIENT'S MEMORY ADEQUATE TO SAFELY COMPLETE DAILY ACTIVITIES?: YES
GROCERY_SHOPPING: INDEPENDENT
BATHING: INDEPENDENT
USING TRANSPORTATION: INDEPENDENT
PREPARING MEALS: INDEPENDENT

## 2024-07-18 ASSESSMENT — COLUMBIA-SUICIDE SEVERITY RATING SCALE - C-SSRS
6. HAVE YOU EVER DONE ANYTHING, STARTED TO DO ANYTHING, OR PREPARED TO DO ANYTHING TO END YOUR LIFE?: NO
1. IN THE PAST MONTH, HAVE YOU WISHED YOU WERE DEAD OR WISHED YOU COULD GO TO SLEEP AND NOT WAKE UP?: NO
2. HAVE YOU ACTUALLY HAD ANY THOUGHTS OF KILLING YOURSELF?: NO

## 2024-07-18 ASSESSMENT — PATIENT HEALTH QUESTIONNAIRE - PHQ9
3. TROUBLE FALLING OR STAYING ASLEEP OR SLEEPING TOO MUCH: NEARLY EVERY DAY
7. TROUBLE CONCENTRATING ON THINGS, SUCH AS READING THE NEWSPAPER OR WATCHING TELEVISION: NOT AT ALL
SUM OF ALL RESPONSES TO PHQ9 QUESTIONS 1 AND 2: 4
5. POOR APPETITE OR OVEREATING: NEARLY EVERY DAY
SUM OF ALL RESPONSES TO PHQ QUESTIONS 1-9: 15
8. MOVING OR SPEAKING SO SLOWLY THAT OTHER PEOPLE COULD HAVE NOTICED. OR THE OPPOSITE, BEING SO FIGETY OR RESTLESS THAT YOU HAVE BEEN MOVING AROUND A LOT MORE THAN USUAL: NOT AT ALL
6. FEELING BAD ABOUT YOURSELF - OR THAT YOU ARE A FAILURE OR HAVE LET YOURSELF OR YOUR FAMILY DOWN: MORE THAN HALF THE DAYS
9. THOUGHTS THAT YOU WOULD BE BETTER OFF DEAD, OR OF HURTING YOURSELF: NOT AT ALL
1. LITTLE INTEREST OR PLEASURE IN DOING THINGS: MORE THAN HALF THE DAYS
4. FEELING TIRED OR HAVING LITTLE ENERGY: NEARLY EVERY DAY
10. IF YOU CHECKED OFF ANY PROBLEMS, HOW DIFFICULT HAVE THESE PROBLEMS MADE IT FOR YOU TO DO YOUR WORK, TAKE CARE OF THINGS AT HOME, OR GET ALONG WITH OTHER PEOPLE: VERY DIFFICULT
2. FEELING DOWN, DEPRESSED OR HOPELESS: MORE THAN HALF THE DAYS

## 2024-07-18 ASSESSMENT — ENCOUNTER SYMPTOMS
OCCASIONAL FEELINGS OF UNSTEADINESS: 1
DEPRESSION: 1
LOSS OF SENSATION IN FEET: 0

## 2024-07-18 NOTE — PROGRESS NOTES
Primary Care Physician: Vance Bagley MD    Date of Visit: 07/18/2024    Chief Complaint:     Chief Complaint   Patient presents with    Medicare Annual Wellness Visit Subsequent   ACP:  2 DAUGHTERS  CARLOS MAN.  Has a son as well, but he lives in [place]     Subjective:  Patient Missy Kirk is a 83 y.o. female  that presents for Medicare Wellness    HPI:  HPI   NOW ON HEMODIALYSIS.   Left arm fistula does not work.  Has radha catheter.  Feels fatigued post hd  DEPRESSION.  SHE IS ON LEXAPRO.  SHE IS NOT SURE IF SHE WANTS TO INCREAS LEXAPRO DOSE AT THIS TIME   SLIPPED DOWN STAIRS--1EPISODE  HAD PHONE IN ONE HAND AND HD JUGS IN OTHER HAND    A LITTLE BALANCE ISSUS  HEARING AIDS   RIGHT HAND  DOES NOT NEED   Past Medical History:  Past Medical History:   Diagnosis Date    Acute upper respiratory infection, unspecified     Upper respiratory infection of multiple sites    C. difficile diarrhea 11/28/2023    Chronic maxillary sinusitis     Sinusitis, maxillary, chronic    Fatigue 11/28/2023    Generalized abdominal pain 12/17/2020    Other specified disorders of bone, unspecified site     Exostosis    Other specified postprocedural states     History of cardiac cath    Personal history of diseases of the skin and subcutaneous tissue 04/16/2015    History of cyst of breast    Personal history of other diseases of the musculoskeletal system and connective tissue     History of myositis    Personal history of other diseases of the nervous system and sense organs     History of impacted cerumen    Personal history of other diseases of the respiratory system     History of acute pharyngitis    Personal history of other specified conditions     History of abnormal weight loss    Personal history of pneumonia (recurrent) 01/09/2018    History of pneumonia    Personal history of urinary (tract) infections     History of urinary tract infection    Right upper quadrant pain     Abdominal pain, RUQ (right upper quadrant)    Sprain  of ligaments of thoracic spine, initial encounter     Thoracic sprain and strain    Unspecified abnormal findings in urine     Nonspecific findings on examination of urine        Surgical History:  Past Surgical History:   Procedure Laterality Date    APPENDECTOMY  04/15/2015    Appendectomy    CT GUIDED CHEST TUBE PLACEMENT  11/21/2017    CT GUIDED CHEST TUBE PLACEMENT 11/21/2017 [facility]    TONSILLECTOMY  04/15/2015    Tonsillectomy    US GUIDED THYROID BIOPSY  3/25/2022    US GUIDED THYROID BIOPSY 3/25/2022 [facility]        Immunizations:   Immunization History   Administered Date(s) Administered    Moderna SARS-CoV-2 Vaccination 01/21/2021, 02/17/2021, 11/02/2021, 04/26/2022    Pfizer COVID-19 vaccine, Fall 2023, 12 years and older, (30mcg/0.3mL) 10/11/2023        Family History:  No family history on file.     Social History:  Social History     Socioeconomic History    Marital status:      Spouse name: Not on file    Number of children: Not on file    Years of education: Not on file    Highest education level: Not on file   Occupational History    Not on file   Tobacco Use    Smoking status: Never     Passive exposure: Never    Smokeless tobacco: Never   Substance and Sexual Activity    Alcohol use: Not Currently    Drug use: Never    Sexual activity: Not on file   Other Topics Concern    Not on file   Social History Narrative    Not on file     Social Determinants of Health     Financial Resource Strain: Low Risk  (11/9/2023)    Received from [facility]    Overall Financial Resource Strain (CARDIA)     Difficulty of Paying Living Expenses: Not hard at all   Food Insecurity: No Food Insecurity (11/9/2023)    [facility]    Hunger Vital Sign     Worried About Running Out of Food in the Last Year: Never true     Ran Out of Food in the Last Year: Never true   Transportation Needs: Unknown (11/9/2023)    Received from [facility], [facility]    PRAPARE - Transportation     Lack of Transportation (Medical): No     Lack of Transportation (Non-Medical): Not on file   Physical Activity: Not on file   Stress: Not on file   Social Connections: Not on file   Intimate Partner Violence: Not on file   Housing Stability: Unknown (11/9/2023)    Received from [facility], [facility]    Housing Stability Vital Sign     Unable to Pay for Housing in the Last Year: No     Number of Places Lived in the Last Year: Not on file     In the last 12 months, was there a time when you did not have a steady place to sleep or slept in a shelter (including now)?: No        Medications:  Current Outpatient Medications   Medication Instructions    calcitriol (Rocaltrol) 0.25 mcg capsule     carvedilol (COREG) 12.5 mg, oral, 2 times daily (morning and late afternoon)    cloNIDine (CATAPRES) 0.1 mg, oral, As needed, Take if BP exceeds 160    dapagliflozin propanediol (FARXIGA) 5 mg, oral, Daily    doxazosin (CARDURA) 1 mg, oral, Nightly    escitalopram (LEXAPRO) 10 mg, oral, Daily    furosemide (LASIX) 20 mg, oral, Daily    hydrALAZINE (APRESOLINE) 100 mg, oral, 3 times daily    NIFEdipine ER (ADALAT CC) 90 mg, oral, Daily, Do not crush, chew, or split.    Kevin Caps 1 mg capsule 1 tablet, oral, Daily (0630)    SandoSTATIN 50 mcg, subcutaneous, Every 30 days    sodium bicarbonate 650 mg, oral, 3 times daily    spironolactone (ALDACTONE) 25 mg, oral, Daily    valsartan (DIOVAN) 80 mg, oral, 2 times daily        Allergies:  Allergies   Allergen Reactions    Codeine Unknown     GI upset    Fentanyl Unknown and Other    Oxycodone Unknown    Verapamil Unknown        ROS:  Review of Systems   Constitutional:  Positive for fatigue. Negative for activity change, chills, fever and unexpected weight change.   HENT:  Negative for congestion, dental problem, ear pain, sinus pressure, sinus pain, sore throat and trouble swallowing.    Eyes:  Negative for  "photophobia, discharge, redness and visual disturbance.   Respiratory:  Negative for cough, chest tightness, shortness of breath and wheezing.    Cardiovascular:  Negative for chest pain, palpitations and leg swelling.   Gastrointestinal:  Negative for abdominal pain, blood in stool, constipation, diarrhea, nausea and vomiting.   Endocrine: Negative for cold intolerance, heat intolerance, polydipsia, polyphagia and polyuria.   Genitourinary:  Negative for difficulty urinating, dysuria, flank pain, frequency and urgency.   Musculoskeletal:  Negative for arthralgias, joint swelling and myalgias.   Skin:  Negative for color change and rash.   Allergic/Immunologic: Negative for environmental allergies, food allergies and immunocompromised state.   Neurological:  Negative for dizziness, weakness, light-headedness, numbness and headaches.   Hematological:  Does not bruise/bleed easily.   Psychiatric/Behavioral:  Negative for dysphoric mood and sleep disturbance. The patient is not nervous/anxious.         No anxiety.  No depression        Vitals:  /80 (BP Location: Right arm, Patient Position: Sitting, BP Cuff Size: Adult)   Pulse 66   Ht 1.575 m (5' 2\")   Wt 67.1 kg (148 lb)   SpO2 97%   BMI 27.07 kg/m²   Wt Readings from Last 2 Encounters:   07/18/24 67.1 kg (148 lb)   11/20/23 70.8 kg (156 lb)       Physical Exam:  Physical Exam  Constitutional:       General: She is not in acute distress.     Appearance: Normal appearance. She is well-developed and well-groomed.   HENT:      Head: Normocephalic and atraumatic.      Right Ear: Tympanic membrane and ear canal normal.      Left Ear: Tympanic membrane and ear canal normal.      Nose: Nose normal.      Mouth/Throat:      Mouth: Mucous membranes are moist.      Pharynx: Oropharynx is clear.   Eyes:      Conjunctiva/sclera: Conjunctivae normal.      Pupils: Pupils are equal, round, and reactive to light.   Neck:      Thyroid: No thyromegaly.   Cardiovascular:      " Rate and Rhythm: Normal rate and regular rhythm.      Heart sounds: Normal heart sounds, S1 normal and S2 normal. No murmur heard.  Pulmonary:      Effort: Pulmonary effort is normal.      Breath sounds: Normal breath sounds and air entry. No wheezing, rhonchi or rales.   Abdominal:      General: Bowel sounds are normal. There is no distension.      Palpations: Abdomen is soft.      Tenderness: There is no abdominal tenderness. There is no guarding or rebound.   Musculoskeletal:         General: No swelling or tenderness. Normal range of motion.      Cervical back: Normal, full passive range of motion without pain, normal range of motion and neck supple.      Thoracic back: Normal.      Lumbar back: Normal.      Right lower leg: No edema.      Left lower leg: No edema.      Comments: Upper and lower extrems are wnl--inspection and palpation.   Strength is normal and symmetric.   Lymphadenopathy:      Cervical: No cervical adenopathy.   Skin:     General: Skin is warm and dry.      Findings: No bruising, erythema, lesion or rash.      Comments: Intact   Neurological:      General: No focal deficit present.      Mental Status: She is alert and oriented to person, place, and time.      Sensory: Sensation is intact.      Motor: Motor function is intact.      Deep Tendon Reflexes: Reflexes are normal and symmetric.   Psychiatric:         Mood and Affect: Mood and affect normal.         Speech: Speech normal.         Behavior: Behavior normal. Behavior is cooperative.           Orders:  Orders Placed This Encounter   Procedures    Disability Placard    CBC and Auto Differential    Comprehensive Metabolic Panel    Lipid Panel    Urinalysis with Reflex Microscopic    Vitamin D 25-Hydroxy,Total (for eval of Vitamin D levels)    TSH with reflex to Free T4 if abnormal    Hemoglobin A1C         Future Appointments:  Future Appointments   Date Time Provider Department Center   11/1/2024 11:40 AM Jeremiah Mitchell DPM  [facility]             Assessment/Plan:  Diagnoses and all orders for this visit:  Encounter for Medicare annual wellness exam  -     CBC and Auto Differential; Future  -     Comprehensive Metabolic Panel; Future  -     Lipid Panel; Future  -     Urinalysis with Reflex Microscopic; Future  -     Vitamin D 25-Hydroxy,Total (for eval of Vitamin D levels); Future  -     TSH with reflex to Free T4 if abnormal; Future  -     Hemoglobin A1C; Future  Malignant carcinoid tumor of small intestine, unspecified location (Multi)  ESRD (end stage renal disease) on dialysis (Multi)  -     Disability Placard  Essential (primary) hypertension  Aortic valve insufficiency, etiology of cardiac valve disease unspecified  -     Disability Placard  Anemia, unspecified type  -     CBC and Auto Differential; Future  Mixed hyperlipidemia  -     Lipid Panel; Future  Vitamin D deficiency  -     Vitamin D 25-Hydroxy,Total (for eval of Vitamin D levels); Future  Screening for thyroid disorder  -     TSH with reflex to Free T4 if abnormal; Future  Screening for diabetes mellitus  -     Hemoglobin A1C; Future  Hyperglycemia  -     Hemoglobin A1C; Future                Vance Bagley MD

## 2024-07-19 LAB
EST. AVERAGE GLUCOSE BLD GHB EST-MCNC: 85 MG/DL
HBA1C MFR BLD: 4.6 %

## 2024-08-01 ENCOUNTER — APPOINTMENT (OUTPATIENT)
Dept: PODIATRY | Facility: CLINIC | Age: 84
End: 2024-08-01
Payer: MEDICARE

## 2024-08-01 DIAGNOSIS — B35.1 TINEA UNGUIUM: ICD-10-CM

## 2024-08-01 DIAGNOSIS — M79.674 TOE PAIN, RIGHT: Primary | ICD-10-CM

## 2024-08-01 DIAGNOSIS — M79.675 TOE PAIN, LEFT: ICD-10-CM

## 2024-08-01 PROCEDURE — 1036F TOBACCO NON-USER: CPT | Performed by: PODIATRIST

## 2024-08-01 PROCEDURE — 1157F ADVNC CARE PLAN IN RCRD: CPT | Performed by: PODIATRIST

## 2024-08-01 PROCEDURE — 11721 DEBRIDE NAIL 6 OR MORE: CPT | Performed by: PODIATRIST

## 2024-08-01 PROCEDURE — 1159F MED LIST DOCD IN RCRD: CPT | Performed by: PODIATRIST

## 2024-08-01 NOTE — PROGRESS NOTES
CC:  painful thickened and elongated toenails     HPI:  This presents complaining  painful thickened and elongated toenails that are difficult to manage.  Onset was gradual with worsening course until recently.  Aggravated by shoe gear and ambulation.         PCP: Dr. Bagley  Last visit: 4-3-24      PMH  Medical History        Past Medical History:   Diagnosis Date    Acute upper respiratory infection, unspecified       Upper respiratory infection of multiple sites    C. difficile diarrhea 11/28/2023    Chronic maxillary sinusitis       Sinusitis, maxillary, chronic    Fatigue 11/28/2023    Generalized abdominal pain 12/17/2020    Other specified disorders of bone, unspecified site       Exostosis    Other specified postprocedural states       History of cardiac cath    Personal history of diseases of the skin and subcutaneous tissue 04/16/2015     History of cyst of breast    Personal history of other diseases of the musculoskeletal system and connective tissue       History of myositis    Personal history of other diseases of the nervous system and sense organs       History of impacted cerumen    Personal history of other diseases of the respiratory system       History of acute pharyngitis    Personal history of other specified conditions       History of abnormal weight loss    Personal history of pneumonia (recurrent) 01/09/2018     History of pneumonia    Personal history of urinary (tract) infections       History of urinary tract infection    Right upper quadrant pain       Abdominal pain, RUQ (right upper quadrant)    Sprain of ligaments of thoracic spine, initial encounter       Thoracic sprain and strain    Unspecified abnormal findings in urine       Nonspecific findings on examination of urine         MEDS    Current Medications      Current Outpatient Medications:     carvedilol (Coreg) 12.5 mg tablet, Take 1 tablet (12.5 mg) by mouth 2 times a day with meals., Disp: , Rfl:     cloNIDine (Catapres) 0.1  mg tablet, Take 1 tablet (0.1 mg) by mouth if needed for high blood pressure. Take if BP exceeds 160, Disp: , Rfl:     dapagliflozin propanediol (Farxiga) 5 mg, Take 1 tablet (5 mg) by mouth once daily., Disp: , Rfl:     doxazosin (Cardura) 1 mg tablet, Take 1 tablet (1 mg) by mouth once daily at bedtime., Disp: 100 tablet, Rfl: 1    escitalopram (Lexapro) 10 mg tablet, Take 1 tablet (10 mg) by mouth once daily., Disp: 100 tablet, Rfl: 1    furosemide (Lasix) 20 mg tablet, Take 1 tablet (20 mg) by mouth once daily., Disp: , Rfl:     hydrALAZINE (Apresoline) 100 mg tablet, Take 1 tablet (100 mg) by mouth 3 times a day., Disp: , Rfl:     NIFEdipine ER (Adalat CC) 90 mg 24 hr tablet, Take 1 tablet (90 mg) by mouth once daily. Do not crush, chew, or split., Disp: , Rfl:     octreotide (SandoSTATIN) 50 mcg/mL solution injection, Inject 1 mL (50 mcg) under the skin every 30 (thirty) days., Disp: , Rfl:     sodium bicarbonate 650 mg tablet, Take 1 tablet (650 mg) by mouth 3 times a day., Disp: , Rfl:     spironolactone (Aldactone) 25 mg tablet, Take 1 tablet (25 mg) by mouth once daily., Disp: , Rfl:     valsartan (Diovan) 80 mg tablet, Take 1 tablet (80 mg) by mouth twice a day., Disp: , Rfl:      Allergies  Allergies         Allergies   Allergen Reactions    Codeine Unknown       GI upset    Fentanyl Unknown and Other    Oxycodone Unknown    Verapamil Unknown         Social History   Social History            Socioeconomic History    Marital status:        Spouse name: Not on file    Number of children: Not on file    Years of education: Not on file    Highest education level: Not on file   Occupational History    Not on file   Tobacco Use    Smoking status: Never       Passive exposure: Never    Smokeless tobacco: Never   Substance and Sexual Activity    Alcohol use: Not Currently    Drug use: Never    Sexual activity: Not on file   Other Topics Concern    Not on file   Social History Narrative    Not on file       Social Determinants of Health           Financial Resource Strain: Low Risk  (11/9/2023)     Received from [facility]     Overall Financial Resource Strain (CARDIA)      Difficulty of Paying Living Expenses: Not hard at all   Food Insecurity: No Food Insecurity (11/9/2023)     Received from [facility]     Hunger Vital Sign      Worried About Running Out of Food in the Last Year: Never true      Ran Out of Food in the Last Year: Never true   Transportation Needs: Unknown (11/9/2023)     Received from [facility]     PRAPARE - Transportation      Lack of Transportation (Medical): No      Lack of Transportation (Non-Medical): Not on file   Physical Activity: Not on file   Stress: Not on file   Social Connections: Not on file   Intimate Partner Violence: Not on file   Housing Stability: Unknown (11/9/2023)     Received from [facility]     Housing Stability Vital Sign      Unable to Pay for Housing in the Last Year: No      Number of Places Lived in the Last Year: Not on file      Unstable Housing in the Last Year: No         Family History   No family history on file.     Surgical History         Past Surgical History:   Procedure Laterality Date    APPENDECTOMY   04/15/2015     Appendectomy    CT GUIDED CHEST TUBE PLACEMENT   11/21/2017     CT GUIDED CHEST TUBE PLACEMENT 11/21/2017 [facility]    TONSILLECTOMY   04/15/2015     Tonsillectomy    US GUIDED THYROID BIOPSY   3/25/2022     US GUIDED THYROID BIOPSY 3/25/2022 [facility]            REVIEW OF SYSTEMS     DERM:   + as noted in HPI.         Physical examination:   On General Observation: Patient is a pleasant, cooperative, well developed 83 y.o.  adult female. The patient is alert and oriented to time, place and person. Patient has normal affect and mood.  There were no vitals taken for this visit.     Vascular:  DP and PT pulses are 1-2/4 b/l.  mild edema noted. mild varicosities b/l.  CFT  6 seconds to all digits bilateral.   Skin temperature is warm to warm from proximal to distal bilateral.       Muscular: Strength is 5/5 for all instrinsic and extrinsic muscle groups.      Neuro:  Proprioception present.   Sensation to vibration is  present. Protective sensation present  at all pedal sites via [place] Vita 5.07 monofilament bilateral.  Light touch intact bilateral.      Derm:    Decreased hair growth b/l le  Left toenails: 1-5 Brittleness, crumbling upon debridement, subungual debris, elongation, mycotic appearance, tenderness, and thickness.   Right toenails: 1-5 Brittleness, crumbling upon debridement, subungual debris, elongation, mycotic appearance, tenderness, and thickness.         ASSESSMENT:    Tinea Unguium [B35.1]   Pain in right toe(s) [M79.674]   Pain in left toe(s) [M79.675]         PLAN:   A comprehensive history and physical examination were preformed. The patient was educated on clinical findings, diagnosis and treatment plans. Patient understands all that has been explained and all questions were answered to apparent satisfaction.      - Debrided toenails 1-10 in length and height.   - Follow up in 9-12 weeks.         Jeremiah Mitchell DPM

## 2024-08-18 PROBLEM — I10 ESSENTIAL (PRIMARY) HYPERTENSION: Status: ACTIVE | Noted: 2024-08-18

## 2024-08-18 PROBLEM — Z00.00 ENCOUNTER FOR MEDICARE ANNUAL WELLNESS EXAM: Status: ACTIVE | Noted: 2024-08-18

## 2024-08-18 ASSESSMENT — ENCOUNTER SYMPTOMS
DYSPHORIC MOOD: 0
PALPITATIONS: 0
NUMBNESS: 0
NAUSEA: 0
ABDOMINAL PAIN: 0
NERVOUS/ANXIOUS: 0
SORE THROAT: 0
POLYDIPSIA: 0
WEAKNESS: 0
CONSTIPATION: 0
CHILLS: 0
CHEST TIGHTNESS: 0
PHOTOPHOBIA: 0
ACTIVITY CHANGE: 0
FREQUENCY: 0
MYALGIAS: 0
JOINT SWELLING: 0
COLOR CHANGE: 0
DYSURIA: 0
FATIGUE: 1
EYE REDNESS: 0
HEADACHES: 0
UNEXPECTED WEIGHT CHANGE: 0
TROUBLE SWALLOWING: 0
POLYPHAGIA: 0
FLANK PAIN: 0
FEVER: 0
VOMITING: 0
BRUISES/BLEEDS EASILY: 0
SLEEP DISTURBANCE: 0
WHEEZING: 0
DIZZINESS: 0
ARTHRALGIAS: 0
COUGH: 0
SINUS PRESSURE: 0
LIGHT-HEADEDNESS: 0
DIFFICULTY URINATING: 0
SINUS PAIN: 0
EYE DISCHARGE: 0
DIARRHEA: 0
SHORTNESS OF BREATH: 0
BLOOD IN STOOL: 0

## 2024-11-01 ENCOUNTER — APPOINTMENT (OUTPATIENT)
Dept: PODIATRY | Facility: CLINIC | Age: 84
End: 2024-11-01
Payer: MEDICARE

## 2024-11-01 DIAGNOSIS — M79.674 TOE PAIN, RIGHT: ICD-10-CM

## 2024-11-01 DIAGNOSIS — B35.1 TINEA UNGUIUM: Primary | ICD-10-CM

## 2024-11-01 DIAGNOSIS — M79.675 TOE PAIN, LEFT: ICD-10-CM

## 2024-11-01 PROCEDURE — 1157F ADVNC CARE PLAN IN RCRD: CPT | Performed by: PODIATRIST

## 2024-11-01 PROCEDURE — 1160F RVW MEDS BY RX/DR IN RCRD: CPT | Performed by: PODIATRIST

## 2024-11-01 PROCEDURE — 1036F TOBACCO NON-USER: CPT | Performed by: PODIATRIST

## 2024-11-01 PROCEDURE — 11721 DEBRIDE NAIL 6 OR MORE: CPT | Performed by: PODIATRIST

## 2024-11-01 PROCEDURE — 1159F MED LIST DOCD IN RCRD: CPT | Performed by: PODIATRIST

## 2024-11-26 ENCOUNTER — OFFICE VISIT (OUTPATIENT)
Dept: PRIMARY CARE | Facility: CLINIC | Age: 84
End: 2024-11-26
Payer: MEDICARE

## 2024-11-26 VITALS
RESPIRATION RATE: 18 BRPM | BODY MASS INDEX: 27.2 KG/M2 | HEART RATE: 79 BPM | WEIGHT: 147.8 LBS | TEMPERATURE: 97.2 F | SYSTOLIC BLOOD PRESSURE: 140 MMHG | HEIGHT: 62 IN | DIASTOLIC BLOOD PRESSURE: 70 MMHG | OXYGEN SATURATION: 96 %

## 2024-11-26 DIAGNOSIS — J01.90 ACUTE NON-RECURRENT SINUSITIS, UNSPECIFIED LOCATION: Primary | ICD-10-CM

## 2024-11-26 PROCEDURE — 99213 OFFICE O/P EST LOW 20 MIN: CPT | Performed by: INTERNAL MEDICINE

## 2024-11-26 PROCEDURE — 3077F SYST BP >= 140 MM HG: CPT | Performed by: INTERNAL MEDICINE

## 2024-11-26 PROCEDURE — 1159F MED LIST DOCD IN RCRD: CPT | Performed by: INTERNAL MEDICINE

## 2024-11-26 PROCEDURE — 3078F DIAST BP <80 MM HG: CPT | Performed by: INTERNAL MEDICINE

## 2024-11-26 PROCEDURE — 1036F TOBACCO NON-USER: CPT | Performed by: INTERNAL MEDICINE

## 2024-11-26 PROCEDURE — 1157F ADVNC CARE PLAN IN RCRD: CPT | Performed by: INTERNAL MEDICINE

## 2024-11-26 RX ORDER — ROSUVASTATIN CALCIUM 10 MG/1
10 TABLET, COATED ORAL
COMMUNITY

## 2024-11-26 RX ORDER — AMOXICILLIN AND CLAVULANATE POTASSIUM 875; 125 MG/1; MG/1
875 TABLET, FILM COATED ORAL 2 TIMES DAILY
Qty: 20 TABLET | Refills: 0 | Status: SHIPPED | OUTPATIENT
Start: 2024-11-26 | End: 2024-12-06

## 2024-11-26 RX ORDER — METOPROLOL SUCCINATE 25 MG/1
25 TABLET, EXTENDED RELEASE ORAL DAILY
COMMUNITY
Start: 2024-01-12

## 2024-11-26 RX ORDER — CHOLESTYRAMINE 4 G/9G
1 POWDER, FOR SUSPENSION ORAL DAILY
COMMUNITY
Start: 2024-10-02

## 2024-11-26 RX ORDER — TORSEMIDE 20 MG/1
20 TABLET ORAL DAILY
COMMUNITY
Start: 2024-09-04

## 2024-11-26 RX ORDER — LOSARTAN POTASSIUM 25 MG/1
25 TABLET ORAL DAILY
COMMUNITY
Start: 2024-09-04

## 2024-11-26 NOTE — PROGRESS NOTES
"Primary Care Physician: Vance Bagley MD    Date of Visit: 11/26/2024     Chief Complaint:   Chief Complaint   Patient presents with    Otitis Media    Sinusitis         HPI:  Sinusitis        Subjective:   Missy Kirk is a 84 y.o. female presents ear pain and head congestion  States that she has had head congestion and ear pain/fullness for the past 4 days or so.   +postnasal gtt.    +nasal discharge  +lightheadedness/dizziness.  No f/chills   +fatigue---now getting epogen and iron with dialysis     Allergies:  Allergies   Allergen Reactions    Codeine Unknown     GI upset    Fentanyl Unknown and Other    Oxycodone Unknown    Verapamil Unknown       Outpatient Medications:  Current Outpatient Medications   Medication Instructions    calcitriol (Rocaltrol) 0.25 mcg capsule     carvedilol (COREG) 12.5 mg, 2 times daily (morning and late afternoon)    cholestyramine (Questran) 4 gram packet 1 packet, Daily    cloNIDine (CATAPRES) 0.1 mg, As needed    dapagliflozin propanediol (FARXIGA) 5 mg, Daily    doxazosin (CARDURA) 1 mg, oral, Nightly    escitalopram (LEXAPRO) 10 mg, oral, Daily    furosemide (LASIX) 20 mg, Daily    hydrALAZINE (APRESOLINE) 100 mg, 3 times daily    losartan (COZAAR) 25 mg, Daily    metoprolol succinate XL (TOPROL-XL) 25 mg, Daily    NIFEdipine ER (ADALAT CC) 90 mg, Daily    Beltrami Caps 1 mg capsule 1 tablet, Daily (0630)    rosuvastatin (CRESTOR) 10 mg, Daily RT    SandoSTATIN 50 mcg, Every 30 days    sodium bicarbonate 650 mg, 3 times daily    spironolactone (ALDACTONE) 25 mg, Daily    torsemide (DEMADEX) 20 mg, Daily    valsartan (DIOVAN) 80 mg, oral, 2 times daily       ROS:   Review of Systems     Vitals:  /70 (BP Location: Right arm, Patient Position: Sitting, BP Cuff Size: Adult)   Pulse 79   Temp 36.2 °C (97.2 °F) (Temporal)   Resp 18   Ht 1.575 m (5' 2\")   Wt 67 kg (147 lb 12.8 oz)   SpO2 96%   BMI 27.03 kg/m²   Wt Readings from Last 3 Encounters:   11/26/24 67 kg (147 lb " 12.8 oz)   07/18/24 67.1 kg (148 lb)   11/20/23 70.8 kg (156 lb)     BP Readings from Last 3 Encounters:   11/26/24 140/70   07/18/24 132/80   11/20/23 126/80        Physical Exam:  Physical Exam  Constitutional:       Appearance: Normal appearance. She is normal weight.      Comments: Looks a little tired.    HENT:      Head: Normocephalic and atraumatic.      Right Ear: Tympanic membrane normal. There is no impacted cerumen.      Left Ear: Tympanic membrane normal. There is no impacted cerumen.      Mouth/Throat:      Mouth: Mucous membranes are moist.      Pharynx: Oropharynx is clear.   Eyes:      Extraocular Movements: Extraocular movements intact.      Pupils: Pupils are equal, round, and reactive to light.   Cardiovascular:      Rate and Rhythm: Normal rate and regular rhythm.   Pulmonary:      Effort: Pulmonary effort is normal.      Breath sounds: Normal breath sounds. No wheezing, rhonchi or rales.      Comments: +chest tightness with deep breath.    Chest:      Chest wall: Tenderness present.   Musculoskeletal:      Cervical back: Neck supple.   Neurological:      Mental Status: She is alert.          Assessment/Plan   Diagnoses and all orders for this visit:  Acute non-recurrent sinusitis, unspecified location  -     amoxicillin-pot clavulanate (Augmentin) 875-125 mg tablet; Take 1 tablet (875 mg) by mouth 2 times a day for 10 days.      Orders:  No orders of the defined types were placed in this encounter.       Followup Appts:  Future Appointments   Date Time Provider Department Center   1/31/2025 11:40 AM Jeremiah Mitchell DPM [facility]           ____________________________________________________________  Vance Bagley MD

## 2025-01-31 ENCOUNTER — APPOINTMENT (OUTPATIENT)
Dept: PODIATRY | Facility: CLINIC | Age: 85
End: 2025-01-31
Payer: MEDICARE

## 2025-01-31 DIAGNOSIS — M79.674 TOE PAIN, RIGHT: ICD-10-CM

## 2025-01-31 DIAGNOSIS — M79.675 TOE PAIN, LEFT: ICD-10-CM

## 2025-01-31 DIAGNOSIS — B35.1 TINEA UNGUIUM: Primary | ICD-10-CM

## 2025-01-31 PROCEDURE — 1160F RVW MEDS BY RX/DR IN RCRD: CPT | Performed by: PODIATRIST

## 2025-01-31 PROCEDURE — 1036F TOBACCO NON-USER: CPT | Performed by: PODIATRIST

## 2025-01-31 PROCEDURE — 1157F ADVNC CARE PLAN IN RCRD: CPT | Performed by: PODIATRIST

## 2025-01-31 PROCEDURE — 1159F MED LIST DOCD IN RCRD: CPT | Performed by: PODIATRIST

## 2025-01-31 NOTE — PROGRESS NOTES
CC:  painful thickened and elongated toenails     HPI:  This presents complaining  painful thickened and elongated toenails that are difficult to manage.  Onset was gradual with worsening course until recently.  Aggravated by shoe gear and ambulation. Seen with daughter. Also wants laser to 4 toes.        PCP: Dr. Bagley  Last visit: 11-1-24      PMH  Medical History           Past Medical History:   Diagnosis Date    Acute upper respiratory infection, unspecified       Upper respiratory infection of multiple sites    C. difficile diarrhea 11/28/2023    Chronic maxillary sinusitis       Sinusitis, maxillary, chronic    Fatigue 11/28/2023    Generalized abdominal pain 12/17/2020    Other specified disorders of bone, unspecified site       Exostosis    Other specified postprocedural states       History of cardiac cath    Personal history of diseases of the skin and subcutaneous tissue 04/16/2015     History of cyst of breast    Personal history of other diseases of the musculoskeletal system and connective tissue       History of myositis    Personal history of other diseases of the nervous system and sense organs       History of impacted cerumen    Personal history of other diseases of the respiratory system       History of acute pharyngitis    Personal history of other specified conditions       History of abnormal weight loss    Personal history of pneumonia (recurrent) 01/09/2018     History of pneumonia    Personal history of urinary (tract) infections       History of urinary tract infection    Right upper quadrant pain       Abdominal pain, RUQ (right upper quadrant)    Sprain of ligaments of thoracic spine, initial encounter       Thoracic sprain and strain    Unspecified abnormal findings in urine       Nonspecific findings on examination of urine         MEDS     Current Medications      Current Outpatient Medications:     carvedilol (Coreg) 12.5 mg tablet, Take 1 tablet (12.5 mg) by mouth 2 times a day  with meals., Disp: , Rfl:     cloNIDine (Catapres) 0.1 mg tablet, Take 1 tablet (0.1 mg) by mouth if needed for high blood pressure. Take if BP exceeds 160, Disp: , Rfl:     dapagliflozin propanediol (Farxiga) 5 mg, Take 1 tablet (5 mg) by mouth once daily., Disp: , Rfl:     doxazosin (Cardura) 1 mg tablet, Take 1 tablet (1 mg) by mouth once daily at bedtime., Disp: 100 tablet, Rfl: 1    escitalopram (Lexapro) 10 mg tablet, Take 1 tablet (10 mg) by mouth once daily., Disp: 100 tablet, Rfl: 1    furosemide (Lasix) 20 mg tablet, Take 1 tablet (20 mg) by mouth once daily., Disp: , Rfl:     hydrALAZINE (Apresoline) 100 mg tablet, Take 1 tablet (100 mg) by mouth 3 times a day., Disp: , Rfl:     NIFEdipine ER (Adalat CC) 90 mg 24 hr tablet, Take 1 tablet (90 mg) by mouth once daily. Do not crush, chew, or split., Disp: , Rfl:     octreotide (SandoSTATIN) 50 mcg/mL solution injection, Inject 1 mL (50 mcg) under the skin every 30 (thirty) days., Disp: , Rfl:     sodium bicarbonate 650 mg tablet, Take 1 tablet (650 mg) by mouth 3 times a day., Disp: , Rfl:     spironolactone (Aldactone) 25 mg tablet, Take 1 tablet (25 mg) by mouth once daily., Disp: , Rfl:     valsartan (Diovan) 80 mg tablet, Take 1 tablet (80 mg) by mouth twice a day., Disp: , Rfl:       Allergies  Allergies             Allergies   Allergen Reactions    Codeine Unknown       GI upset    Fentanyl Unknown and Other    Oxycodone Unknown    Verapamil Unknown         Social History   Social History                Socioeconomic History    Marital status:        Spouse name: Not on file    Number of children: Not on file    Years of education: Not on file    Highest education level: Not on file   Occupational History    Not on file   Tobacco Use    Smoking status: Never       Passive exposure: Never    Smokeless tobacco: Never   Substance and Sexual Activity    Alcohol use: Not Currently    Drug use: Never    Sexual activity: Not on file   Other Topics  Concern    Not on file   Social History Narrative    Not on file      Social Determinants of Health              Financial Resource Strain: Low Risk  (11/9/2023)     Received from [facility]     Overall Financial Resource Strain (CARDIA)      Difficulty of Paying Living Expenses: Not hard at all   Food Insecurity: No Food Insecurity (11/9/2023)     Received from [facility]     Hunger Vital Sign      Worried About Running Out of Food in the Last Year: Never true      Ran Out of Food in the Last Year: Never true   Transportation Needs: Unknown (11/9/2023)     Received from [facility]     PRAPARE - Transportation      Lack of Transportation (Medical): No      Lack of Transportation (Non-Medical): Not on file   Physical Activity: Not on file   Stress: Not on file   Social Connections: Not on file   Intimate Partner Violence: Not on file   Housing Stability: Unknown (11/9/2023)     Received from [facility]     Housing Stability Vital Sign      Unable to Pay for Housing in the Last Year: No      Number of Places Lived in the Last Year: Not on file      Unstable Housing in the Last Year: No         Family History   No family history on file.      Surgical History             Past Surgical History:   Procedure Laterality Date    APPENDECTOMY   04/15/2015     Appendectomy    CT GUIDED CHEST TUBE PLACEMENT   11/21/2017     CT GUIDED CHEST TUBE PLACEMENT 11/21/2017 [facility]    TONSILLECTOMY   04/15/2015     Tonsillectomy    US GUIDED THYROID BIOPSY   3/25/2022     US GUIDED THYROID BIOPSY 3/25/2022 [facility]            REVIEW OF SYSTEMS     DERM:   + as noted in HPI.         Physical examination:   On General Observation: Patient is a pleasant, cooperative, well developed 83 y.o.  adult female. The patient is alert and oriented to time, place and person. Patient has normal affect and mood.  There were no vitals taken for this visit.     Vascular:  DP and PT pulses are 1-2/4 b/l.  mild  edema noted. mild varicosities b/l.  CFT  6 seconds to all digits bilateral.  Skin temperature is warm to warm from proximal to distal bilateral.       Muscular: Strength is 5/5 for all instrinsic and extrinsic muscle groups.      Neuro:  Proprioception present.   Sensation to vibration is  present. Protective sensation present  at all pedal sites via [place] Vita 5.07 monofilament bilateral.  Light touch intact bilateral.      Derm:    Decreased hair growth b/l le  Left toenails: 1, 5 Brittleness, crumbling upon debridement, subungual debris, elongation, mycotic appearance, tenderness, and thickness.   Right toenails: 1, 5 Brittleness, crumbling upon debridement, subungual debris, elongation, mycotic appearance, tenderness, and thickness.         ASSESSMENT:    Tinea Unguium [B35.1]   Pain in right toe(s) [M79.674]   Pain in left toe(s) [M79.675]         PLAN:   Consent reviewed and signed for the [place] foot laser, reviiewed risks, benefits, complications, 1st and 5th b/l debrided. Pre laser picture obtained, laser done times 4 toes, no complications, will continue the formula 3, follow up 3 months.        Jeremiah Mitchell DPM

## 2025-02-21 ENCOUNTER — PATIENT OUTREACH (OUTPATIENT)
Dept: CARE COORDINATION | Facility: CLINIC | Age: 85
End: 2025-02-21
Payer: MEDICARE

## 2025-02-25 ENCOUNTER — OFFICE VISIT (OUTPATIENT)
Dept: PRIMARY CARE | Facility: CLINIC | Age: 85
End: 2025-02-25
Payer: MEDICARE

## 2025-02-25 VITALS
SYSTOLIC BLOOD PRESSURE: 142 MMHG | TEMPERATURE: 98.6 F | RESPIRATION RATE: 20 BRPM | DIASTOLIC BLOOD PRESSURE: 68 MMHG | BODY MASS INDEX: 27.05 KG/M2 | OXYGEN SATURATION: 96 % | HEART RATE: 71 BPM | WEIGHT: 147 LBS | HEIGHT: 62 IN

## 2025-02-25 DIAGNOSIS — J40 BRONCHITIS: ICD-10-CM

## 2025-02-25 DIAGNOSIS — R11.0 NAUSEA: ICD-10-CM

## 2025-02-25 DIAGNOSIS — R53.1 GENERAL WEAKNESS: ICD-10-CM
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
## 2025-02-25 DIAGNOSIS — R44.1 HALLUCINATIONS, VISUAL: ICD-10-CM

## 2025-02-25 DIAGNOSIS — N39.0 URINARY TRACT INFECTION WITHOUT HEMATURIA, SITE UNSPECIFIED: Primary | ICD-10-CM

## 2025-02-25 DIAGNOSIS — B33.8 RSV (RESPIRATORY SYNCYTIAL VIRUS INFECTION): ICD-10-CM

## 2025-02-25 PROCEDURE — G2211 COMPLEX E/M VISIT ADD ON: HCPCS | Performed by: INTERNAL MEDICINE

## 2025-02-25 PROCEDURE — 3078F DIAST BP <80 MM HG: CPT | Performed by: INTERNAL MEDICINE

## 2025-02-25 PROCEDURE — 1157F ADVNC CARE PLAN IN RCRD: CPT | Performed by: INTERNAL MEDICINE

## 2025-02-25 PROCEDURE — 3077F SYST BP >= 140 MM HG: CPT | Performed by: INTERNAL MEDICINE

## 2025-02-25 PROCEDURE — 1159F MED LIST DOCD IN RCRD: CPT | Performed by: INTERNAL MEDICINE

## 2025-02-25 PROCEDURE — 99214 OFFICE O/P EST MOD 30 MIN: CPT | Performed by: INTERNAL MEDICINE

## 2025-02-25 PROCEDURE — 99496 TRANSJ CARE MGMT HIGH F2F 7D: CPT | Performed by: INTERNAL MEDICINE

## 2025-02-25 RX ORDER — AMOXICILLIN AND CLAVULANATE POTASSIUM 875; 125 MG/1; MG/1
875 TABLET, FILM COATED ORAL 2 TIMES DAILY
Qty: 14 TABLET | Refills: 0 | Status: SHIPPED | OUTPATIENT
Start: 2025-02-25 | End: 2025-02-26 | Stop reason: SDUPTHER

## 2025-02-25 RX ORDER — PREDNISONE 10 MG/1
TABLET ORAL
Qty: 14 TABLET | Refills: 0 | Status: SHIPPED | OUTPATIENT
Start: 2025-02-25 | End: 2025-02-26 | Stop reason: SDUPTHER

## 2025-02-25 RX ORDER — ONDANSETRON 4 MG/1
4 TABLET, ORALLY DISINTEGRATING ORAL EVERY 8 HOURS PRN
Qty: 30 TABLET | Refills: 2 | Status: SHIPPED | OUTPATIENT
Start: 2025-02-25 | End: 2025-02-26 | Stop reason: SDUPTHER

## 2025-02-25 NOTE — PROGRESS NOTES
Date of Visit: 02/25/2025     Chief Complaint:   Chief Complaint   Patient presents with    Hospital Follow-up       HPI:  HPI  Subjective:   Missy Kirk is a 84 y.o. female presents hospital f/up  Daughter, Alicia, gives the hx.  Last Monday, pt was not feeling well.  To the ed at [facility] for eval.   Dx'd with rsv, bronchitis, and uti.   Admitted x 4 days.  Given ivabx's.    Disch'd to home on Thurs with po abx x 2 days, and home PT/OT.  Alicia reports that she was having visual hallucinations.   Unfortunately, on Friday, pt apparently became confused and fell in her bedroom.  Back to the ed for eval.   Multiple bruises and contusions, but no broken bones.   Discharged to home.  Alicia reports that she will still sometimes have visual hallucinations.    She still has cough and chest congestion.    Alicia reports wheezing.  No fever or chills.  On hd--that is going well.    Pt c/o nausea.  Worse in the am.    ROS:   Review of Systems   Respiratory:  Positive for cough and wheezing.    Psychiatric/Behavioral:  Positive for confusion and hallucinations.          Outpatient Medications:  Current Outpatient Medications   Medication Instructions    calcitriol (Rocaltrol) 0.25 mcg capsule     carvedilol (COREG) 12.5 mg, 2 times daily (morning and late afternoon)    cholestyramine (Questran) 4 gram packet 1 packet, Daily    cloNIDine (CATAPRES) 0.1 mg, As needed    dapagliflozin propanediol (FARXIGA) 5 mg, Daily    doxazosin (CARDURA) 1 mg, oral, Nightly    escitalopram (LEXAPRO) 10 mg, oral, Daily    furosemide (LASIX) 20 mg, Daily    hydrALAZINE (APRESOLINE) 100 mg, 3 times daily    losartan (COZAAR) 25 mg, Daily    metoprolol succinate XL (TOPROL-XL) 25 mg, Daily    NIFEdipine ER (ADALAT CC) 90 mg, Daily    Carson Caps 1 mg capsule 1 tablet, Daily (0630)    rosuvastatin (CRESTOR) 10 mg, Daily RT    SandoSTATIN 50 mcg, Every 30 days    sodium bicarbonate 650 mg, 3 times daily    spironolactone (ALDACTONE)  25 mg, Daily    torsemide (DEMADEX) 20 mg, Daily    valsartan (DIOVAN) 80 mg, oral, 2 times daily       Allergies:  Allergies   Allergen Reactions    Codeine Unknown     GI upset    Fentanyl Unknown and Other    Oxycodone Unknown    Verapamil Unknown       Past Medical History:   Diagnosis Date    Acute upper respiratory infection, unspecified     Upper respiratory infection of multiple sites    C. difficile diarrhea 11/28/2023    Chronic maxillary sinusitis     Sinusitis, maxillary, chronic    Fatigue 11/28/2023    Generalized abdominal pain 12/17/2020    Other specified disorders of bone, unspecified site     Exostosis    Other specified postprocedural states     History of cardiac cath    Personal history of diseases of the skin and subcutaneous tissue 04/16/2015    History of cyst of breast    Personal history of other diseases of the musculoskeletal system and connective tissue     History of myositis    Personal history of other diseases of the nervous system and sense organs     History of impacted cerumen    Personal history of other diseases of the respiratory system     History of acute pharyngitis    Personal history of other specified conditions     History of abnormal weight loss    Personal history of pneumonia (recurrent) 01/09/2018    History of pneumonia    Personal history of urinary (tract) infections     History of urinary tract infection    Right upper quadrant pain     Abdominal pain, RUQ (right upper quadrant)    Sprain of ligaments of thoracic spine, initial encounter     Thoracic sprain and strain    Unspecified abnormal findings in urine     Nonspecific findings on examination of urine        Health Maintenance   Topic Date Due    Bone Density Scan  Never done    DTaP/Tdap/Td Vaccines (1 - Tdap) Never done    Pneumococcal Vaccine (2 of 2 - PCV) 12/17/2021    Diabetes Screening  07/18/2025    Medicare Annual Wellness Visit (AWV)  07/19/2025    Lipid Panel  07/18/2029    RSV High Risk:  (Elderly (60+) or Pregnant Population)  Completed    Influenza Vaccine  Completed    Zoster Vaccines  Completed    COVID-19 Vaccine  Completed    HIB Vaccines  Aged Out    Hepatitis B Vaccines  Aged Out    IPV Vaccines  Aged Out    Hepatitis A Vaccines  Aged Out    Meningococcal Vaccine  Aged Out    Rotavirus Vaccines  Aged Out    HPV Vaccines  Aged Out        Past Surgical History:   Procedure Laterality Date    APPENDECTOMY  04/15/2015    Appendectomy    CT GUIDED CHEST TUBE PLACEMENT  11/21/2017    CT GUIDED CHEST TUBE PLACEMENT 11/21/2017 [facility]    TONSILLECTOMY  04/15/2015    Tonsillectomy    US GUIDED THYROID BIOPSY  3/25/2022    US GUIDED THYROID BIOPSY 3/25/2022 [facility]GAC[facility]        No family history on file.     Social History     Socioeconomic History    Marital status:    Tobacco Use    Smoking status: Never     Passive exposure: Never    Smokeless tobacco: Never   Substance and Sexual Activity    Alcohol use: Not Currently    Drug use: Never     Social Drivers of Health     Financial Resource Strain: Low Risk  (11/9/2023)    Received from [facility]    Overall Financial Resource Strain (CARDIA)     Difficulty of Paying Living Expenses: Not hard at all   Food Insecurity: No Food Insecurity (11/9/2023)    Received from [facility]    Hunger Vital Sign     Worried About Running Out of Food in the Last Year: Never true     Ran Out of Food in the Last Year: Never true   Transportation Needs: Unknown (11/9/2023)    Received from [facility]    PRAPARE - Transportation     Lack of Transportation (Medical): No   Housing Stability: Unknown (11/9/2023)    Received from [facility]    Housing Stability Vital Sign     Unable to Pay for Housing in the Last Year: No     Unstable Housing in the Last Year: No          Vitals:  /68 (BP Location: Right arm, Patient Position: Sitting, BP Cuff Size:  "Adult)   Pulse 71   Temp 37 °C (98.6 °F) (Temporal)   Resp 20   Ht 1.575 m (5' 2\")   Wt 66.7 kg (147 lb)   SpO2 96%   BMI 26.89 kg/m²   Wt Readings from Last 3 Encounters:   02/25/25 66.7 kg (147 lb)   11/26/24 67 kg (147 lb 12.8 oz)   07/18/24 67.1 kg (148 lb)     BP Readings from Last 3 Encounters:   02/25/25 142/68   11/26/24 140/70   07/18/24 132/80        Physical Exam:  Physical Exam  Vitals reviewed.   Constitutional:       Appearance: Normal appearance. She is ill-appearing (chronically ill appearing). She is not toxic-appearing.   HENT:      Head: Normocephalic and atraumatic.      Right Ear: Tympanic membrane and ear canal normal.      Left Ear: Tympanic membrane and ear canal normal.      Nose: Nose normal.      Mouth/Throat:      Mouth: Mucous membranes are moist.      Pharynx: Oropharynx is clear.   Eyes:      Conjunctiva/sclera: Conjunctivae normal.      Pupils: Pupils are equal, round, and reactive to light.   Cardiovascular:      Rate and Rhythm: Normal rate and regular rhythm.      Heart sounds: Normal heart sounds, S1 normal and S2 normal. No murmur heard.  Pulmonary:      Effort: Pulmonary effort is normal. No accessory muscle usage.      Breath sounds: Decreased breath sounds, wheezing and rhonchi present. No rales.   Abdominal:      General: Bowel sounds are normal.      Palpations: Abdomen is soft.   Musculoskeletal:         General: Normal range of motion.      Cervical back: Neck supple.   Skin:     General: Skin is warm and dry.      Comments: Multiple bruises.   Neurological:      Mental Status: She is alert and oriented to person, place, and time.          Assessment/Plan   Diagnoses and all orders for this visit:  Urinary tract infection without hematuria, site unspecified  -     Urinalysis with Reflex Microscopic  -     Urine Culture; Future  Bronchitis  -     predniSONE (Deltasone) 10 mg tablet; Take 6 tablets (60 mg) by mouth once daily for 1 day, THEN 4 tablets (40 mg) once " daily for 1 day, THEN 2 tablets (20 mg) once daily for 1 day, THEN 1 tablet (10 mg) once daily for 1 day, THEN 0.5 tablets (5 mg) once daily for 1 day.  -     amoxicillin-pot clavulanate (Augmentin) 875-125 mg tablet; Take 1 tablet (875 mg) by mouth 2 times a day.  RSV (respiratory syncytial virus infection)  -     predniSONE (Deltasone) 10 mg tablet; Take 6 tablets (60 mg) by mouth once daily for 1 day, THEN 4 tablets (40 mg) once daily for 1 day, THEN 2 tablets (20 mg) once daily for 1 day, THEN 1 tablet (10 mg) once daily for 1 day, THEN 0.5 tablets (5 mg) once daily for 1 day.  Nausea  -     ondansetron ODT (Zofran-ODT) 4 mg disintegrating tablet; Dissolve 1 tablet (4 mg) in the mouth every 8 hours if needed for nausea or vomiting.  General weakness  Fall, initial encounter  Hallucinations, visual    Repeat ua and cx  Home PT/OT    Orders:  No orders of the defined types were placed in this encounter.       Followup Appts:  Future Appointments   Date Time Provider Department Center   5/2/2025 10:40 AM Jeremiah Mitchell DPM [facility]           ____________________________________________________________  Vance Bagley MD

## 2025-02-26 ENCOUNTER — TELEPHONE (OUTPATIENT)
Dept: PRIMARY CARE | Facility: CLINIC | Age: 85
End: 2025-02-26
Payer: MEDICARE

## 2025-02-26 DIAGNOSIS — B33.8 RSV (RESPIRATORY SYNCYTIAL VIRUS INFECTION): ICD-10-CM

## 2025-02-26 DIAGNOSIS — J40 BRONCHITIS: ICD-10-CM

## 2025-02-26 DIAGNOSIS — R11.0 NAUSEA: ICD-10-CM

## 2025-02-26 PROBLEM — R44.1 HALLUCINATIONS, VISUAL: Status: ACTIVE | Noted: 2025-02-26

## 2025-02-26 RX ORDER — PREDNISONE 10 MG/1
TABLET ORAL
Qty: 14 TABLET | Refills: 0 | Status: SHIPPED | OUTPATIENT
Start: 2025-02-26 | End: 2025-02-26 | Stop reason: SDUPTHER

## 2025-02-26 RX ORDER — PREDNISONE 10 MG/1
TABLET ORAL
Qty: 14 TABLET | Refills: 0 | Status: SHIPPED | OUTPATIENT
Start: 2025-02-26 | End: 2025-03-03

## 2025-02-26 RX ORDER — AMOXICILLIN AND CLAVULANATE POTASSIUM 875; 125 MG/1; MG/1
875 TABLET, FILM COATED ORAL 2 TIMES DAILY
Qty: 14 TABLET | Refills: 0 | Status: SHIPPED | OUTPATIENT
Start: 2025-02-26 | End: 2025-02-26 | Stop reason: SDUPTHER

## 2025-02-26 RX ORDER — ONDANSETRON 4 MG/1
4 TABLET, ORALLY DISINTEGRATING ORAL EVERY 8 HOURS PRN
Qty: 30 TABLET | Refills: 2 | Status: SHIPPED | OUTPATIENT
Start: 2025-02-26 | End: 2025-02-26 | Stop reason: SDUPTHER

## 2025-02-26 RX ORDER — ONDANSETRON 4 MG/1
4 TABLET, ORALLY DISINTEGRATING ORAL EVERY 8 HOURS PRN
Qty: 30 TABLET | Refills: 2 | Status: SHIPPED | OUTPATIENT
Start: 2025-02-26

## 2025-02-26 RX ORDER — AMOXICILLIN AND CLAVULANATE POTASSIUM 875; 125 MG/1; MG/1
875 TABLET, FILM COATED ORAL 2 TIMES DAILY
Qty: 14 TABLET | Refills: 0 | Status: SHIPPED | OUTPATIENT
Start: 2025-02-26

## 2025-02-26 ASSESSMENT — ENCOUNTER SYMPTOMS
HALLUCINATIONS: 1
COUGH: 1
CONFUSION: 1
WHEEZING: 1

## 2025-02-26 NOTE — TELEPHONE ENCOUNTER
HEIN NURSE CALLING FROM [facility] CALLED TO LET US KNOW SHE SAW HER YESTERDAY FOR SKILLED NURSING/PT/OT  FYI ONLY

## 2025-02-27 LAB — BACTERIA UR CULT: NORMAL

## 2025-03-06 ENCOUNTER — PATIENT OUTREACH (OUTPATIENT)
Dept: CARE COORDINATION | Facility: CLINIC | Age: 85
End: 2025-03-06
Payer: MEDICARE

## 2025-03-24 ENCOUNTER — APPOINTMENT (OUTPATIENT)
Dept: PRIMARY CARE | Facility: CLINIC | Age: 85
End: 2025-03-24
Payer: MEDICARE

## 2025-03-24 VITALS
BODY MASS INDEX: 27.23 KG/M2 | SYSTOLIC BLOOD PRESSURE: 138 MMHG | WEIGHT: 148 LBS | HEIGHT: 62 IN | HEART RATE: 72 BPM | OXYGEN SATURATION: 94 % | DIASTOLIC BLOOD PRESSURE: 76 MMHG

## 2025-03-24 DIAGNOSIS — R09.02 HYPOXEMIA: ICD-10-CM

## 2025-03-24 DIAGNOSIS — J01.10 ACUTE FRONTAL SINUSITIS, RECURRENCE NOT SPECIFIED: Primary | ICD-10-CM

## 2025-03-24 PROCEDURE — 1157F ADVNC CARE PLAN IN RCRD: CPT | Performed by: INTERNAL MEDICINE

## 2025-03-24 PROCEDURE — G2211 COMPLEX E/M VISIT ADD ON: HCPCS | Performed by: INTERNAL MEDICINE

## 2025-03-24 PROCEDURE — 3078F DIAST BP <80 MM HG: CPT | Performed by: INTERNAL MEDICINE

## 2025-03-24 PROCEDURE — 1159F MED LIST DOCD IN RCRD: CPT | Performed by: INTERNAL MEDICINE

## 2025-03-24 PROCEDURE — 99214 OFFICE O/P EST MOD 30 MIN: CPT | Performed by: INTERNAL MEDICINE

## 2025-03-24 PROCEDURE — 3075F SYST BP GE 130 - 139MM HG: CPT | Performed by: INTERNAL MEDICINE

## 2025-03-24 RX ORDER — AMOXICILLIN AND CLAVULANATE POTASSIUM 875; 125 MG/1; MG/1
875 TABLET, FILM COATED ORAL 2 TIMES DAILY
Qty: 20 TABLET | Refills: 0 | Status: SHIPPED | OUTPATIENT
Start: 2025-03-24

## 2025-03-24 NOTE — PROGRESS NOTES
Date of Visit: 03/24/2025     Chief Complaint:   Chief Complaint   Patient presents with    Headache    Ear Fullness    Sinus Problem       HPI:  HPI  Subjective:   Missy Kirk is a 84 y.o. female presents   Pt had hearing eval.  Was noted to have ear wax.  It was removed and fluid was noted behind the ear drum.  Pt reports decreased hearing, pressure type headache and sinus pressure.   No fever or chills.  Daughter notes that she has to have the tv volume in the 90's.      Has had visiting nursing post hospital admission.  Noted to have pox readings down to the 70's.  Daughter reports that she is 'panting'  Recent hx of pneumonia.    ROS:   Review of Systems   HENT:          Hearing loss         Outpatient Medications:  Current Outpatient Medications   Medication Instructions    amoxicillin-pot clavulanate (Augmentin) 875-125 mg tablet 875 mg, oral, 2 times daily    calcitriol (Rocaltrol) 0.25 mcg capsule     carvedilol (COREG) 12.5 mg, 2 times daily (morning and late afternoon)    cholestyramine (Questran) 4 gram packet 1 packet, Daily    cloNIDine (CATAPRES) 0.1 mg, As needed    dapagliflozin propanediol (FARXIGA) 5 mg, Daily    doxazosin (CARDURA) 1 mg, oral, Nightly    escitalopram (LEXAPRO) 10 mg, oral, Daily    furosemide (LASIX) 20 mg, Daily    hydrALAZINE (APRESOLINE) 100 mg, 3 times daily    losartan (COZAAR) 25 mg, Daily    metoprolol succinate XL (TOPROL-XL) 25 mg, Daily    NIFEdipine ER (ADALAT CC) 90 mg, Daily    ondansetron ODT (ZOFRAN-ODT) 4 mg, oral, Every 8 hours PRN    Searcy Caps 1 mg capsule 1 tablet, Daily (0630)    rosuvastatin (CRESTOR) 10 mg, Daily RT    SandoSTATIN 50 mcg, Every 30 days    sodium bicarbonate 650 mg, 3 times daily    spironolactone (ALDACTONE) 25 mg, Daily    torsemide (DEMADEX) 20 mg, Daily    valsartan (DIOVAN) 80 mg, oral, 2 times daily       Allergies:  Allergies   Allergen Reactions    Codeine Unknown     GI upset    Fentanyl Unknown and Other    Oxycodone Unknown     Verapamil Unknown       Past Medical History:   Diagnosis Date    Acute upper respiratory infection, unspecified     Upper respiratory infection of multiple sites    C. difficile diarrhea 11/28/2023    Chronic maxillary sinusitis     Sinusitis, maxillary, chronic    Fatigue 11/28/2023    Generalized abdominal pain 12/17/2020    Other specified disorders of bone, unspecified site     Exostosis    Other specified postprocedural states     History of cardiac cath    Personal history of diseases of the skin and subcutaneous tissue 04/16/2015    History of cyst of breast    Personal history of other diseases of the musculoskeletal system and connective tissue     History of myositis    Personal history of other diseases of the nervous system and sense organs     History of impacted cerumen    Personal history of other diseases of the respiratory system     History of acute pharyngitis    Personal history of other specified conditions     History of abnormal weight loss    Personal history of pneumonia (recurrent) 01/09/2018    History of pneumonia    Personal history of urinary (tract) infections     History of urinary tract infection    Right upper quadrant pain     Abdominal pain, RUQ (right upper quadrant)    Sprain of ligaments of thoracic spine, initial encounter     Thoracic sprain and strain    Unspecified abnormal findings in urine     Nonspecific findings on examination of urine        Health Maintenance   Topic Date Due    Bone Density Scan  Never done    DTaP/Tdap/Td Vaccines (1 - Tdap) Never done    Pneumococcal Vaccine (2 of 2 - PCV) 12/17/2021    Diabetes Screening  07/18/2025    Medicare Annual Wellness Visit (AWV)  07/19/2025    Lipid Panel  07/18/2029    RSV High Risk: (Elderly (60+) or Pregnant Population)  Completed    Influenza Vaccine  Completed    Zoster Vaccines  Completed    COVID-19 Vaccine  Completed    HIB Vaccines  Aged Out    Hepatitis B Vaccines  Aged Out    IPV Vaccines  Aged Out     "Hepatitis A Vaccines  Aged Out    Meningococcal Vaccine  Aged Out    Rotavirus Vaccines  Aged Out    HPV Vaccines  Aged Out       Past Surgical History:   Procedure Laterality Date    APPENDECTOMY  04/15/2015    Appendectomy    CT GUIDED CHEST TUBE PLACEMENT  11/21/2017    CT GUIDED CHEST TUBE PLACEMENT 11/21/2017 [facility]    TONSILLECTOMY  04/15/2015    Tonsillectomy    US GUIDED THYROID BIOPSY  3/25/2022    US GUIDED THYROID BIOPSY 3/25/2022 [facility]       No family history on file.      Social History     Socioeconomic History    Marital status:    Tobacco Use    Smoking status: Never     Passive exposure: Never    Smokeless tobacco: Never   Substance and Sexual Activity    Alcohol use: Not Currently    Drug use: Never     Social Drivers of Health     Financial Resource Strain: Low Risk  (11/9/2023)    Received from [facility]    Overall Financial Resource Strain (CARDIA)     Difficulty of Paying Living Expenses: Not hard at all   Food Insecurity: No Food Insecurity (11/9/2023)    Received from [facility]    Hunger Vital Sign     Worried About Running Out of Food in the Last Year: Never true     Ran Out of Food in the Last Year: Never true   Transportation Needs: Unknown (11/9/2023)    Received from [facility]    PRAPARE - Transportation     Lack of Transportation (Medical): No   Housing Stability: Unknown (11/9/2023)    Received from [facility]    Housing Stability Vital Sign     Unable to Pay for Housing in the Last Year: No     Unstable Housing in the Last Year: No          Vitals:  /76 (BP Location: Right arm, Patient Position: Sitting, BP Cuff Size: Adult)   Pulse 72   Ht 1.575 m (5' 2\")   Wt 67.1 kg (148 lb)   SpO2 94%   BMI 27.07 kg/m²   Wt Readings from Last 3 Encounters:   03/24/25 67.1 kg (148 lb)   02/25/25 66.7 kg (147 lb)   11/26/24 67 kg (147 lb 12.8 oz)     BP Readings " from Last 3 Encounters:   03/24/25 138/76   02/25/25 142/68   11/26/24 140/70        Physical Exam:  Physical Exam  Constitutional:       Appearance: She is well-developed. She is ill-appearing. She is not toxic-appearing.   HENT:      Head: Normocephalic and atraumatic.      Right Ear: Tympanic membrane normal.      Left Ear: Tympanic membrane normal.      Nose:      Right Sinus: Frontal sinus tenderness present.      Left Sinus: Frontal sinus tenderness present.      Mouth/Throat:      Mouth: Mucous membranes are moist.      Pharynx: Oropharynx is clear.   Eyes:      Conjunctiva/sclera: Conjunctivae normal.      Pupils: Pupils are equal, round, and reactive to light.   Cardiovascular:      Rate and Rhythm: Normal rate and regular rhythm.      Heart sounds: Normal heart sounds.   Pulmonary:      Effort: Pulmonary effort is normal. No respiratory distress.      Breath sounds: Normal breath sounds. No wheezing, rhonchi or rales.   Chest:      Chest wall: No tenderness.   Lymphadenopathy:      Cervical: No cervical adenopathy.   Skin:     General: Skin is warm and dry.   Neurological:      Mental Status: She is alert.          Assessment/Plan   Diagnoses and all orders for this visit:  Acute frontal sinusitis, recurrence not specified  -     amoxicillin-pot clavulanate (Augmentin) 875-125 mg tablet; Take 1 tablet (875 mg) by mouth 2 times a day.  Hypoxemia  Ordered 2 night pulse ox testing.     Orders:  No orders of the defined types were placed in this encounter.       Followup Appts:  Future Appointments   Date Time Provider Department Center   5/2/2025 10:40 AM Jeremiah Mitchell DPM [facility]           ____________________________________________________________  Vance Bagley MD

## 2025-05-02 ENCOUNTER — APPOINTMENT (OUTPATIENT)
Dept: PODIATRY | Facility: CLINIC | Age: 85
End: 2025-05-02
Payer: MEDICARE

## 2025-05-02 DIAGNOSIS — M79.674 TOE PAIN, RIGHT: ICD-10-CM

## 2025-05-02 DIAGNOSIS — M79.675 TOE PAIN, LEFT: ICD-10-CM

## 2025-05-02 DIAGNOSIS — B35.1 TINEA UNGUIUM: Primary | ICD-10-CM

## 2025-05-02 PROCEDURE — 1036F TOBACCO NON-USER: CPT | Performed by: PODIATRIST

## 2025-05-02 PROCEDURE — 1157F ADVNC CARE PLAN IN RCRD: CPT | Performed by: PODIATRIST

## 2025-05-02 PROCEDURE — 1159F MED LIST DOCD IN RCRD: CPT | Performed by: PODIATRIST

## 2025-05-02 PROCEDURE — 1160F RVW MEDS BY RX/DR IN RCRD: CPT | Performed by: PODIATRIST

## 2025-05-02 PROCEDURE — 11721 DEBRIDE NAIL 6 OR MORE: CPT | Performed by: PODIATRIST

## 2025-05-02 NOTE — PROGRESS NOTES
CC:  painful thickened and elongated toenails     HPI:  This presents complaining  painful thickened and elongated toenails that are difficult to manage.  Onset was gradual with worsening course until recently.  Aggravated by shoe gear and ambulation. Seen with daughter. Also wants laser to 4 toes.        PCP: Dr. Bagley  Last visit: 3-24-25     PMH  Medical History           Past Medical History:   Diagnosis Date    Acute upper respiratory infection, unspecified       Upper respiratory infection of multiple sites    C. difficile diarrhea 11/28/2023    Chronic maxillary sinusitis       Sinusitis, maxillary, chronic    Fatigue 11/28/2023    Generalized abdominal pain 12/17/2020    Other specified disorders of bone, unspecified site       Exostosis    Other specified postprocedural states       History of cardiac cath    Personal history of diseases of the skin and subcutaneous tissue 04/16/2015     History of cyst of breast    Personal history of other diseases of the musculoskeletal system and connective tissue       History of myositis    Personal history of other diseases of the nervous system and sense organs       History of impacted cerumen    Personal history of other diseases of the respiratory system       History of acute pharyngitis    Personal history of other specified conditions       History of abnormal weight loss    Personal history of pneumonia (recurrent) 01/09/2018     History of pneumonia    Personal history of urinary (tract) infections       History of urinary tract infection    Right upper quadrant pain       Abdominal pain, RUQ (right upper quadrant)    Sprain of ligaments of thoracic spine, initial encounter       Thoracic sprain and strain    Unspecified abnormal findings in urine       Nonspecific findings on examination of urine         MEDS     Current Medications      Current Outpatient Medications:     carvedilol (Coreg) 12.5 mg tablet, Take 1 tablet (12.5 mg) by mouth 2 times a day  with meals., Disp: , Rfl:     cloNIDine (Catapres) 0.1 mg tablet, Take 1 tablet (0.1 mg) by mouth if needed for high blood pressure. Take if BP exceeds 160, Disp: , Rfl:     dapagliflozin propanediol (Farxiga) 5 mg, Take 1 tablet (5 mg) by mouth once daily., Disp: , Rfl:     doxazosin (Cardura) 1 mg tablet, Take 1 tablet (1 mg) by mouth once daily at bedtime., Disp: 100 tablet, Rfl: 1    escitalopram (Lexapro) 10 mg tablet, Take 1 tablet (10 mg) by mouth once daily., Disp: 100 tablet, Rfl: 1    furosemide (Lasix) 20 mg tablet, Take 1 tablet (20 mg) by mouth once daily., Disp: , Rfl:     hydrALAZINE (Apresoline) 100 mg tablet, Take 1 tablet (100 mg) by mouth 3 times a day., Disp: , Rfl:     NIFEdipine ER (Adalat CC) 90 mg 24 hr tablet, Take 1 tablet (90 mg) by mouth once daily. Do not crush, chew, or split., Disp: , Rfl:     octreotide (SandoSTATIN) 50 mcg/mL solution injection, Inject 1 mL (50 mcg) under the skin every 30 (thirty) days., Disp: , Rfl:     sodium bicarbonate 650 mg tablet, Take 1 tablet (650 mg) by mouth 3 times a day., Disp: , Rfl:     spironolactone (Aldactone) 25 mg tablet, Take 1 tablet (25 mg) by mouth once daily., Disp: , Rfl:     valsartan (Diovan) 80 mg tablet, Take 1 tablet (80 mg) by mouth twice a day., Disp: , Rfl:       Allergies  Allergies             Allergies   Allergen Reactions    Codeine Unknown       GI upset    Fentanyl Unknown and Other    Oxycodone Unknown    Verapamil Unknown         Social History   Social History                Socioeconomic History    Marital status:        Spouse name: Not on file    Number of children: Not on file    Years of education: Not on file    Highest education level: Not on file   Occupational History    Not on file   Tobacco Use    Smoking status: Never       Passive exposure: Never    Smokeless tobacco: Never   Substance and Sexual Activity    Alcohol use: Not Currently    Drug use: Never    Sexual activity: Not on file   Other Topics  Concern    Not on file   Social History Narrative    Not on file      Social Determinants of Health              Financial Resource Strain: Low Risk  (11/9/2023)     Received from [facility]     Overall Financial Resource Strain (CARDIA)      Difficulty of Paying Living Expenses: Not hard at all   Food Insecurity: No Food Insecurity (11/9/2023)     Received from [facility]     Hunger Vital Sign      Worried About Running Out of Food in the Last Year: Never true      Ran Out of Food in the Last Year: Never true   Transportation Needs: Unknown (11/9/2023)     Received from [facility]     PRAPARE - Transportation      Lack of Transportation (Medical): No      Lack of Transportation (Non-Medical): Not on file   Physical Activity: Not on file   Stress: Not on file   Social Connections: Not on file   Intimate Partner Violence: Not on file   Housing Stability: Unknown (11/9/2023)     Received from [facility]     Housing Stability Vital Sign      Unable to Pay for Housing in the Last Year: No      Number of Places Lived in the Last Year: Not on file      Unstable Housing in the Last Year: No         Family History   No family history on file.      Surgical History             Past Surgical History:   Procedure Laterality Date    APPENDECTOMY   04/15/2015     Appendectomy    CT GUIDED CHEST TUBE PLACEMENT   11/21/2017     CT GUIDED CHEST TUBE PLACEMENT 11/21/2017 [facility]    TONSILLECTOMY   04/15/2015     Tonsillectomy    US GUIDED THYROID BIOPSY   3/25/2022     US GUIDED THYROID BIOPSY 3/25/2022 [facility] LEGAC[facility]            REVIEW OF SYSTEMS     DERM:   + as noted in HPI.         Physical examination:   On General Observation: Patient is a pleasant, cooperative, well developed 83 y.o.  adult female. The patient is alert and oriented to time, place and person. Patient has normal affect and mood.  There were no vitals taken for this visit.     Vascular:  DP and PT pulses are 1-2/4 b/l.  mild  edema noted. mild varicosities b/l.  CFT  6 seconds to all digits bilateral.  Skin temperature is warm to warm from proximal to distal bilateral.       Muscular: Strength is 5/5 for all instrinsic and extrinsic muscle groups.      Neuro:  Proprioception present.   Sensation to vibration is  present. Protective sensation present  at all pedal sites via Pupos[employer] Vita 5.07 monofilament bilateral.  Light touch intact bilateral.      Derm:    Decreased hair growth b/l le  Left toenails: 1, 5 Brittleness, crumbling upon debridement, subungual debris, elongation, mycotic appearance, tenderness, and thickness.   Right toenails: 1, 5 Brittleness, crumbling upon debridement, subungual debris, elongation, mycotic appearance, tenderness, and thickness.         ASSESSMENT:    Tinea Unguium [B35.1]   Pain in right toe(s) [M79.674]   Pain in left toe(s) [M79.675]         PLAN:   Debrided nails 1-10 in length and height  Refilled the formula 3.        Jeremiah Mitchell DPM

## 2025-06-02 ENCOUNTER — APPOINTMENT (OUTPATIENT)
Dept: PRIMARY CARE | Facility: CLINIC | Age: 85
End: 2025-06-02
Payer: MEDICARE

## 2025-06-02 VITALS
TEMPERATURE: 97.5 F | WEIGHT: 144.4 LBS | HEIGHT: 62 IN | BODY MASS INDEX: 26.57 KG/M2 | DIASTOLIC BLOOD PRESSURE: 96 MMHG | OXYGEN SATURATION: 95 % | SYSTOLIC BLOOD PRESSURE: 200 MMHG | HEART RATE: 67 BPM

## 2025-06-02 DIAGNOSIS — J30.9 ALLERGIC RHINITIS, UNSPECIFIED SEASONALITY, UNSPECIFIED TRIGGER: Primary | ICD-10-CM

## 2025-06-02 DIAGNOSIS — J30.9 ALLERGIC RHINITIS, UNSPECIFIED SEASONALITY, UNSPECIFIED TRIGGER: ICD-10-CM

## 2025-06-02 DIAGNOSIS — F32.0 CURRENT MILD EPISODE OF MAJOR DEPRESSIVE DISORDER WITHOUT PRIOR EPISODE: ICD-10-CM

## 2025-06-02 DIAGNOSIS — I16.0 HYPERTENSIVE URGENCY: Primary | ICD-10-CM

## 2025-06-02 PROCEDURE — 3080F DIAST BP >= 90 MM HG: CPT | Performed by: INTERNAL MEDICINE

## 2025-06-02 PROCEDURE — 99214 OFFICE O/P EST MOD 30 MIN: CPT | Performed by: INTERNAL MEDICINE

## 2025-06-02 PROCEDURE — 3077F SYST BP >= 140 MM HG: CPT | Performed by: INTERNAL MEDICINE

## 2025-06-02 PROCEDURE — 1159F MED LIST DOCD IN RCRD: CPT | Performed by: INTERNAL MEDICINE

## 2025-06-02 RX ORDER — AMLODIPINE BESYLATE 10 MG/1
1 TABLET ORAL
COMMUNITY
Start: 2025-05-22

## 2025-06-02 RX ORDER — ESCITALOPRAM OXALATE 10 MG/1
15 TABLET ORAL DAILY
Qty: 150 TABLET | Refills: 1 | Status: SHIPPED | OUTPATIENT
Start: 2025-06-02

## 2025-06-02 RX ORDER — ASPIRIN 81 MG/1
81 TABLET ORAL DAILY
COMMUNITY

## 2025-06-02 ASSESSMENT — PATIENT HEALTH QUESTIONNAIRE - PHQ9
2. FEELING DOWN, DEPRESSED OR HOPELESS: SEVERAL DAYS
1. LITTLE INTEREST OR PLEASURE IN DOING THINGS: SEVERAL DAYS
SUM OF ALL RESPONSES TO PHQ9 QUESTIONS 1 AND 2: 2

## 2025-06-02 NOTE — PROGRESS NOTES
Date of Visit: 06/02/2025     Chief Complaint:   Chief Complaint   Patient presents with    Follow-up     Follow up and was in ER May 15, 2025 for elevated BP and a 3mm brain aneurysm was found       HPI:  HPI  Subjective:   Missy Kirk is a 84 y.o. female presents hypertensive urgency  Daughter noted during dialysis that, pt's sbp was in the 190's.    States that her mom was not as coherent.    To the ed for eval.    Bp 225/121.  Head ct was done and there is a  3mm aneurysm in the brain per daughter.  Nephrologist has changed her medication.   Sbp is still elevated     Depression.    Daughter reports that she has noticed that the pt 'could stay in bed all day.'  Not motivated to do some of the things she was used to.   Dwp; incr lexapro to 15mg daily.     Night time hypoxia.  Pt had an overnight pox that showed a significant drop in O2 at night.  I ordered o2, but the pt has not heard anything.   Will call the company.    Runny nose .  'drips like a faucet.'  Has been told to stop flonase.    Dwp:  will try atrovent nasal spray.     ROS:   Review of Systems      Outpatient Medications:  Current Outpatient Medications   Medication Instructions    amLODIPine (Norvasc) 10 mg tablet 1 tablet, Daily (0630)    amoxicillin-pot clavulanate (Augmentin) 875-125 mg tablet 875 mg, oral, 2 times daily    aspirin 81 mg, oral, Daily    calcitriol (Rocaltrol) 0.25 mcg capsule     carvedilol (COREG) 12.5 mg, 2 times daily (morning and late afternoon)    cholestyramine (Questran) 4 gram packet 1 packet, Daily    cloNIDine (CATAPRES) 0.1 mg, As needed    dapagliflozin propanediol (FARXIGA) 5 mg, Daily    doxazosin (CARDURA) 1 mg, oral, Nightly    escitalopram (LEXAPRO) 10 mg, oral, Daily    furosemide (LASIX) 20 mg, Daily    hydrALAZINE (APRESOLINE) 100 mg, 3 times daily    iron sucrose (VENOFER) 100 mg, intravenous, Once in dialysis    losartan (COZAAR) 25 mg, Daily    metoprolol succinate XL (TOPROL-XL) 25 mg, Daily     "NIFEdipine ER (ADALAT CC) 90 mg, Daily    ondansetron ODT (ZOFRAN-ODT) 4 mg, oral, Every 8 hours PRN    [place] Caps 1 mg capsule 1 tablet, Daily (0630)    rosuvastatin (CRESTOR) 10 mg, Daily RT    SandoSTATIN 50 mcg, Every 30 days    sodium bicarbonate 650 mg, 3 times daily    spironolactone (ALDACTONE) 25 mg, Daily    torsemide (DEMADEX) 20 mg, Daily    valsartan (DIOVAN) 80 mg, oral, 2 times daily       Allergies:  RX Allergies[1]    Medical History[2]     Health Maintenance   Topic Date Due    Bone Density Scan  Never done    DTaP/Tdap/Td Vaccines (1 - Tdap) Never done    Echocardiogram  05/13/2020    Pneumococcal Vaccine (2 of 2 - PCV) 12/17/2021    COVID-19 Vaccine (10 - 2024-25 season) 03/03/2025    Creatinine Level  07/18/2025    Potassium Level  07/18/2025    Diabetes Screening  07/18/2025    Medicare Annual Wellness Visit (AWV)  07/19/2025    Lipid Panel  07/18/2029    RSV High Risk: (Elderly (60+) or Pregnant Population)  Completed    Influenza Vaccine  Completed    Zoster Vaccines  Completed    HIB Vaccines  Aged Out    Hepatitis B Vaccines  Aged Out    IPV Vaccines  Aged Out    Hepatitis A Vaccines  Aged Out    Meningococcal Vaccine  Aged Out    Rotavirus Vaccines  Aged Out    HPV Vaccines  Aged Out       Surgical History[3]    Family History[4]      Social History[5]       Vitals:  BP (!) 200/96 (BP Location: Right arm, Patient Position: Sitting, BP Cuff Size: Adult)   Pulse 67   Temp 36.4 °C (97.5 °F) (Temporal)   Ht 1.575 m (5' 2\")   Wt 65.5 kg (144 lb 6.4 oz)   SpO2 95%   BMI 26.41 kg/m²   Wt Readings from Last 3 Encounters:   06/02/25 65.5 kg (144 lb 6.4 oz)   03/24/25 67.1 kg (148 lb)   02/25/25 66.7 kg (147 lb)     BP Readings from Last 3 Encounters:   06/02/25 (!) 200/96   03/24/25 138/76   02/25/25 142/68        Physical Exam:  Physical Exam  Vitals reviewed.   Constitutional:       Appearance: Normal appearance.   HENT:      Head: Normocephalic and atraumatic.   Cardiovascular:      Rate " and Rhythm: Normal rate and regular rhythm.      Heart sounds: Normal heart sounds, S1 normal and S2 normal. No murmur heard.  Pulmonary:      Effort: Pulmonary effort is normal.      Breath sounds: Normal breath sounds. No wheezing, rhonchi or rales.   Abdominal:      General: Bowel sounds are normal.      Palpations: Abdomen is soft.   Skin:     General: Skin is warm and dry.   Neurological:      General: No focal deficit present.      Mental Status: She is alert and oriented to person, place, and time.   Psychiatric:         Behavior: Behavior is cooperative.          Assessment/Plan   Diagnoses and all orders for this visit:  Hypertensive urgency  Current mild episode of major depressive disorder without prior episode  -     escitalopram (Lexapro) 10 mg tablet; Take 1.5 tablets (15 mg) by mouth once daily.  Allergic rhinitis, unspecified seasonality, unspecified trigger      Orders:  No orders of the defined types were placed in this encounter.       Followup Appts:  Future Appointments   Date Time Provider Department Center   6/24/2025  2:00 PM Vance Bagley MD [id number]   8/1/2025 10:40 AM Jeremiah Mitchell DPM [facility]           ____________________________________________________________  Vance Bagley MD       [1]   Allergies  Allergen Reactions    Codeine Unknown     GI upset    Fentanyl Unknown and Other    Oxycodone Unknown    Verapamil Unknown   [2]   Past Medical History:  Diagnosis Date    Acute upper respiratory infection, unspecified     Upper respiratory infection of multiple sites    C. difficile diarrhea 11/28/2023    Chronic maxillary sinusitis     Sinusitis, maxillary, chronic    Fatigue 11/28/2023    Generalized abdominal pain 12/17/2020    Other specified disorders of bone, unspecified site     Exostosis    Other specified postprocedural states     History of cardiac cath    Personal history of diseases of the skin and subcutaneous tissue 04/16/2015    History of cyst  of breast    Personal history of other diseases of the musculoskeletal system and connective tissue     History of myositis    Personal history of other diseases of the nervous system and sense organs     History of impacted cerumen    Personal history of other diseases of the respiratory system     History of acute pharyngitis    Personal history of other specified conditions     History of abnormal weight loss    Personal history of pneumonia (recurrent) 01/09/2018    History of pneumonia    Personal history of urinary (tract) infections     History of urinary tract infection    Right upper quadrant pain     Abdominal pain, RUQ (right upper quadrant)    Sprain of ligaments of thoracic spine, initial encounter     Thoracic sprain and strain    Unspecified abnormal findings in urine     Nonspecific findings on examination of urine   [3]   Past Surgical History:  Procedure Laterality Date    APPENDECTOMY  04/15/2015    Appendectomy    CT GUIDED CHEST TUBE PLACEMENT  11/21/2017    CT GUIDED CHEST TUBE PLACEMENT 11/21/2017 [facility]    TONSILLECTOMY  04/15/2015    Tonsillectomy    US GUIDED THYROID BIOPSY  3/25/2022    US GUIDED THYROID BIOPSY 3/25/2022 [facility]   [4] No family history on file.  [5]   Social History  Socioeconomic History    Marital status:    Tobacco Use    Smoking status: Never     Passive exposure: Never    Smokeless tobacco: Never   Substance and Sexual Activity    Alcohol use: Not Currently    Drug use: Never     Social Drivers of Health     Financial Resource Strain: Low Risk  (11/9/2023)    Received from [facility]    Overall Financial Resource Strain (CARDIA)     Difficulty of Paying Living Expenses: Not hard at all   Food Insecurity: No Food Insecurity (11/9/2023)    Received from [facility]    Hunger Vital Sign     Worried About Running Out of Food in the Last Year: Never true     Ran Out of Food in the Last Year: Never true   Transportation Needs: Unknown  (11/9/2023)    Received from [facility]    PRAPARE - Transportation     Lack of Transportation (Medical): No   Intimate Partner Violence: Not At Risk (3/31/2025)    Received from [facility]    Humiliation, Afraid, Rape, and Kick questionnaire     Fear of Current or Ex-Partner: No     Emotionally Abused: No     Physically Abused: No     Sexually Abused: No   Housing Stability: Unknown (11/9/2023)    Received from [facility]    Housing Stability Vital Sign     Unable to Pay for Housing in the Last Year: No     Unstable Housing in the Last Year: No

## 2025-06-09 RX ORDER — IPRATROPIUM BROMIDE 21 UG/1
2 SPRAY, METERED NASAL EVERY 12 HOURS
Qty: 30 ML | Refills: 1 | Status: SHIPPED | OUTPATIENT
Start: 2025-06-09 | End: 2026-06-09

## 2025-06-24 ENCOUNTER — APPOINTMENT (OUTPATIENT)
Dept: PRIMARY CARE | Facility: CLINIC | Age: 85
End: 2025-06-24
Payer: MEDICARE

## 2025-06-24 VITALS
HEIGHT: 62 IN | DIASTOLIC BLOOD PRESSURE: 70 MMHG | SYSTOLIC BLOOD PRESSURE: 130 MMHG | TEMPERATURE: 97 F | RESPIRATION RATE: 18 BRPM | BODY MASS INDEX: 26.39 KG/M2 | OXYGEN SATURATION: 97 % | WEIGHT: 143.4 LBS | HEART RATE: 64 BPM

## 2025-06-24 DIAGNOSIS — I50.32 CHRONIC DIASTOLIC CONGESTIVE HEART FAILURE: ICD-10-CM

## 2025-06-24 DIAGNOSIS — E55.9 VITAMIN D DEFICIENCY: ICD-10-CM

## 2025-06-24 DIAGNOSIS — D64.9 ANEMIA, UNSPECIFIED TYPE: ICD-10-CM

## 2025-06-24 DIAGNOSIS — E78.2 MIXED HYPERLIPIDEMIA: ICD-10-CM

## 2025-06-24 DIAGNOSIS — Z13.29 SCREENING FOR THYROID DISORDER: ICD-10-CM

## 2025-06-24 DIAGNOSIS — N18.6 ESRD (END STAGE RENAL DISEASE) (MULTI): ICD-10-CM

## 2025-06-24 DIAGNOSIS — Z13.1 SCREENING FOR DIABETES MELLITUS: ICD-10-CM

## 2025-06-24 DIAGNOSIS — C16.9 MALIGNANT NEOPLASM OF STOMACH, UNSPECIFIED LOCATION (MULTI): ICD-10-CM

## 2025-06-24 DIAGNOSIS — G47.34 IDIOPATHIC SLEEP RELATED NONOBSTRUCTIVE ALVEOLAR HYPOVENTILATION: ICD-10-CM

## 2025-06-24 DIAGNOSIS — Z00.00 ANNUAL PHYSICAL EXAM: ICD-10-CM

## 2025-06-24 DIAGNOSIS — Z00.00 ENCOUNTER FOR MEDICARE ANNUAL WELLNESS EXAM: Primary | ICD-10-CM

## 2025-06-24 DIAGNOSIS — R73.9 HYPERGLYCEMIA: ICD-10-CM

## 2025-06-24 PROCEDURE — G0439 PPPS, SUBSEQ VISIT: HCPCS | Performed by: INTERNAL MEDICINE

## 2025-06-24 PROCEDURE — 1170F FXNL STATUS ASSESSED: CPT | Performed by: INTERNAL MEDICINE

## 2025-06-24 PROCEDURE — 1159F MED LIST DOCD IN RCRD: CPT | Performed by: INTERNAL MEDICINE

## 2025-06-24 PROCEDURE — 3075F SYST BP GE 130 - 139MM HG: CPT | Performed by: INTERNAL MEDICINE

## 2025-06-24 PROCEDURE — 99214 OFFICE O/P EST MOD 30 MIN: CPT | Performed by: INTERNAL MEDICINE

## 2025-06-24 PROCEDURE — 99397 PER PM REEVAL EST PAT 65+ YR: CPT | Performed by: INTERNAL MEDICINE

## 2025-06-24 PROCEDURE — 3078F DIAST BP <80 MM HG: CPT | Performed by: INTERNAL MEDICINE

## 2025-06-24 PROCEDURE — G0444 DEPRESSION SCREEN ANNUAL: HCPCS | Performed by: INTERNAL MEDICINE

## 2025-06-24 ASSESSMENT — ACTIVITIES OF DAILY LIVING (ADL)
PATIENT'S MEMORY ADEQUATE TO SAFELY COMPLETE DAILY ACTIVITIES?: YES
USING TRANSPORTATION: NEEDS ASSISTANCE
DRESSING: INDEPENDENT
PREPARING MEALS: INDEPENDENT
DOING_HOUSEWORK: NEEDS ASSISTANCE
ADEQUATE_TO_COMPLETE_ADL: YES
GROCERY_SHOPPING: NEEDS ASSISTANCE
GROOMING: INDEPENDENT
JUDGMENT_ADEQUATE_SAFELY_COMPLETE_DAILY_ACTIVITIES: YES
TAKING_MEDICATION: NEEDS ASSISTANCE
MANAGING_FINANCES: NEEDS ASSISTANCE
EATING: INDEPENDENT
USING TELEPHONE: INDEPENDENT
FEEDING YOURSELF: INDEPENDENT
NEEDS ASSISTANCE WITH FOOD: INDEPENDENT
BATHING: INDEPENDENT
STIL DRIVING: NO
TOILETING: INDEPENDENT

## 2025-06-24 ASSESSMENT — PATIENT HEALTH QUESTIONNAIRE - PHQ9
1. LITTLE INTEREST OR PLEASURE IN DOING THINGS: NOT AT ALL
2. FEELING DOWN, DEPRESSED OR HOPELESS: NOT AT ALL
SUM OF ALL RESPONSES TO PHQ9 QUESTIONS 1 AND 2: 0

## 2025-06-24 NOTE — PROGRESS NOTES
Date of Visit: 06/24/2025    Chief Complaint:  Chief Complaint   Patient presents with    Medicare Annual Wellness Visit Subsequent       Advance Care Planning   haley     HPI:  HPI   Subjective:  Patient Missy Kirk is a 84 y.o. female  that presents for Medicare Wellness  States that she feels ok.   Had a recent fall at her daughter's home as she was walking up the stairs.  Abrasion on the nose and resolving left black eye. Per daughter, she has had 4-5 falls--fell from bed; leaning forward to  something or tie shoe and fell forward, tripping.    Has given up some independence   She has given up driving.    A little sad that she has given up some independence and is relying on daughters more    Depresssion==taking medication.   Medication is working.  She does not have a depressed mood    Dialysis--is going ok      Nocturnal hypoventilation.  Per daughter, she has done well with the nighttime o2.  No more hallucinations during the night or when she wakes up.  Not snoring as much.    ROS:  Review of Systems   Constitutional:  Negative for activity change, chills, fatigue, fever and unexpected weight change.   HENT:  Negative for congestion, dental problem, ear pain, sinus pressure, sinus pain, sore throat and trouble swallowing.    Eyes:  Negative for photophobia, discharge, redness and visual disturbance.   Respiratory:  Negative for cough, chest tightness, shortness of breath and wheezing.    Cardiovascular:  Negative for chest pain, palpitations and leg swelling.   Gastrointestinal:  Negative for abdominal pain, blood in stool, constipation, diarrhea, nausea and vomiting.   Endocrine: Negative for cold intolerance, heat intolerance, polydipsia, polyphagia and polyuria.   Genitourinary:  Negative for difficulty urinating, dysuria, flank pain, frequency and urgency.   Musculoskeletal:  Negative for arthralgias, joint swelling and myalgias.   Skin:  Negative for color change and rash.    Allergic/Immunologic: Negative for environmental allergies, food allergies and immunocompromised state.   Neurological:  Negative for dizziness, weakness, light-headedness, numbness and headaches.   Hematological:  Does not bruise/bleed easily.   Psychiatric/Behavioral:  Negative for dysphoric mood and sleep disturbance. The patient is not nervous/anxious.        Medications:  Current Outpatient Medications   Medication Instructions    amLODIPine (Norvasc) 10 mg tablet 1 tablet, Daily (0630)    aspirin 81 mg, oral, Daily    calcitriol (Rocaltrol) 0.25 mcg capsule     carvedilol (COREG) 12.5 mg, 2 times daily (morning and late afternoon)    cholestyramine (Questran) 4 gram packet 1 packet, Daily    cloNIDine (CATAPRES) 0.1 mg, As needed    dapagliflozin propanediol (FARXIGA) 5 mg, Daily    doxazosin (CARDURA) 1 mg, oral, Nightly    escitalopram (LEXAPRO) 15 mg, oral, Daily    furosemide (LASIX) 20 mg, Daily    hydrALAZINE (APRESOLINE) 100 mg, 3 times daily    ipratropium (Atrovent) 21 mcg (0.03 %) nasal spray 2 sprays, Each Nostril, Every 12 hours    iron sucrose (VENOFER) 100 mg, intravenous, Once in dialysis    losartan (COZAAR) 25 mg, Daily    metoprolol succinate XL (TOPROL-XL) 25 mg, Daily    NIFEdipine ER (ADALAT CC) 90 mg, Daily    ondansetron ODT (ZOFRAN-ODT) 4 mg, oral, Every 8 hours PRN    [place] Caps 1 mg capsule 1 tablet, Daily (0630)    rosuvastatin (CRESTOR) 10 mg, Daily RT    SandoSTATIN 50 mcg, Every 30 days    sodium bicarbonate 650 mg, 3 times daily    spironolactone (ALDACTONE) 25 mg, Daily    torsemide (DEMADEX) 20 mg, Daily    valsartan (DIOVAN) 80 mg, oral, 2 times daily       Allergies:  RX Allergies[1]    Medical History[2]     Health Maintenance   Topic Date Due    Bone Density Scan  Never done    DTaP/Tdap/Td Vaccines (1 - Tdap) Never done    Echocardiogram  05/13/2020    Pneumococcal Vaccine (2 of 2 - PCV) 12/17/2021    COVID-19 Vaccine (10 - 2024-25 season) 03/03/2025    Medicare Annual  "Wellness Visit (AWV)  07/19/2025    Influenza Vaccine (1) 09/01/2025    Creatinine Level  06/24/2026    Potassium Level  06/24/2026    Diabetes Screening  06/24/2026    Lipid Panel  06/24/2030    RSV High Risk: (Elderly (60+) or Pregnant Population)  Completed    HPV Vaccines (No Doses Required) Completed    Zoster Vaccines  Completed    HIB Vaccines  Aged Out    Hepatitis B Vaccines  Aged Out    IPV Vaccines  Aged Out    Hepatitis A Vaccines  Aged Out    Meningococcal Vaccine  Aged Out    Rotavirus Vaccines  Aged Out        Immunization History   Administered Date(s) Administered    COVID-19 mRNA, bivalent, original/Omicron BA.1, Non-US Vaccine (Spikevax Bivalent), Moderna 09/23/2022    Flu vaccine (IIV4), preservative free *Check age/dose* 09/12/2016    Flu vaccine, quadrivalent, high-dose, preservative free, age 65y+ (FLUZONE) 08/25/2020, 09/06/2023    Flu vaccine, trivalent, preservative free, HIGH-DOSE, age 65y+ (Fluzone) 09/13/2018, 09/13/2019, 08/01/2022, 09/03/2024    Flu vaccine, trivalent, preservative free, age 6 months and greater (Fluarix/Fluzone/Flulaval) 09/23/2015    Influenza, Seasonal, Quadrivalent, Adjuvanted 09/21/2021    Influenza, seasonal, injectable 09/26/2013, 09/16/2014    Moderna SARS-CoV-2 Vaccination 01/21/2021, 02/17/2021, 11/02/2021, 04/26/2022    Novel influenza-H1N1-09, preservative-free 02/23/2010    Pfizer COVID-19 vaccine, 12 years and older, (30mcg/0.3mL) (Comirnaty) 10/11/2023, 09/03/2024    Pneumococcal polysaccharide vaccine, 23-valent, age 2 years and older (PNEUMOVAX 23) 09/16/2014, 12/17/2020    RSV, 60 Years And Older (AREXVY) 09/09/2023    Zoster vaccine, recombinant, adult (SHINGRIX) 10/06/2021, 02/14/2022, 02/16/2022        Surgical History[3]     Family History[4]     Social History[5]     Vitals:  /70 (BP Location: Right arm, Patient Position: Sitting, BP Cuff Size: Adult)   Pulse 64   Temp 36.1 °C (97 °F) (Temporal)   Resp 18   Ht 1.575 m (5' 2\")   Wt 65 " kg (143 lb 6.4 oz)   SpO2 97%   BMI 26.23 kg/m²   BP Readings from Last 3 Encounters:   06/24/25 130/70   06/02/25 (!) 200/96   03/24/25 138/76      Wt Readings from Last 3 Encounters:   06/24/25 65 kg (143 lb 6.4 oz)   06/02/25 65.5 kg (144 lb 6.4 oz)   03/24/25 67.1 kg (148 lb)       Physical Exam:  Physical Exam  Constitutional:       General: She is not in acute distress.     Appearance: Normal appearance. She is well-developed and well-groomed.   HENT:      Head: Normocephalic and atraumatic.      Comments: There is a scabbed abrasion on the nose.    Resolving erythema beneath the left eye.     Right Ear: Tympanic membrane and ear canal normal.      Left Ear: Tympanic membrane and ear canal normal.      Nose: Nose normal.      Mouth/Throat:      Mouth: Mucous membranes are moist.      Pharynx: Oropharynx is clear.   Eyes:      Conjunctiva/sclera: Conjunctivae normal.      Pupils: Pupils are equal, round, and reactive to light.   Neck:      Thyroid: No thyromegaly.   Cardiovascular:      Rate and Rhythm: Normal rate and regular rhythm.      Heart sounds: Normal heart sounds, S1 normal and S2 normal. No murmur heard.  Pulmonary:      Effort: Pulmonary effort is normal.      Breath sounds: Normal breath sounds and air entry. No wheezing, rhonchi or rales.   Abdominal:      General: Bowel sounds are normal. There is no distension.      Palpations: Abdomen is soft.      Tenderness: There is no abdominal tenderness. There is no guarding or rebound.   Musculoskeletal:         General: No swelling or tenderness. Normal range of motion.      Cervical back: Normal, full passive range of motion without pain, normal range of motion and neck supple.      Thoracic back: Normal.      Lumbar back: Normal.      Right lower leg: No edema.      Left lower leg: No edema.      Comments: Upper and lower extrems are wnl--inspection and palpation.   Strength is normal and symmetric.   Lymphadenopathy:      Cervical: No cervical  adenopathy.   Skin:     General: Skin is warm and dry.      Findings: No bruising, erythema, lesion or rash.      Comments: Intact   Neurological:      General: No focal deficit present.      Mental Status: She is alert and oriented to person, place, and time.      Sensory: Sensation is intact.      Motor: Motor function is intact.      Deep Tendon Reflexes: Reflexes are normal and symmetric.   Psychiatric:         Mood and Affect: Mood and affect normal.         Speech: Speech normal.         Behavior: Behavior normal. Behavior is cooperative.         Assessment/Plan:  Diagnoses and all orders for this visit:  Encounter for Medicare annual wellness exam  -     CBC and Auto Differential; Future  -     Comprehensive Metabolic Panel; Future  -     Lipid Panel; Future  -     Urinalysis with Reflex Microscopic; Future  -     Vitamin D 25-Hydroxy,Total (for eval of Vitamin D levels); Future  -     TSH with reflex to Free T4 if abnormal; Future  -     Hemoglobin A1C; Future  Anemia, unspecified type  -     CBC and Auto Differential; Future  Annual physical exam  -     CBC and Auto Differential; Future  -     Urinalysis with Reflex Microscopic; Future  -     Vitamin D 25-Hydroxy,Total (for eval of Vitamin D levels); Future  -     TSH with reflex to Free T4 if abnormal; Future  -     Hemoglobin A1C; Future  Mixed hyperlipidemia  -     Lipid Panel; Future  Vitamin D deficiency  -     Vitamin D 25-Hydroxy,Total (for eval of Vitamin D levels); Future  Screening for thyroid disorder  -     TSH with reflex to Free T4 if abnormal; Future  Screening for diabetes mellitus  -     Hemoglobin A1C; Future  Hyperglycemia  -     Hemoglobin A1C; Future  Idiopathic sleep related nonobstructive alveolar hypoventilation  ESRD (end stage renal disease) (Multi)  Malignant neoplasm of stomach, unspecified location (Multi)  Chronic diastolic congestive heart failure      Orders:  Orders Placed This Encounter   Procedures    CBC and Auto  Differential    Comprehensive Metabolic Panel    Lipid Panel    Urinalysis with Reflex Microscopic    Vitamin D 25-Hydroxy,Total (for eval of Vitamin D levels)    TSH with reflex to Free T4 if abnormal    Hemoglobin A1C       Future Appointments:  Future Appointments   Date Time Provider Department Center   8/1/2025 10:40 AM Jeremiah Mitchell DPM [facility]         Vance Bagley MD         [1]   Allergies  Allergen Reactions    Codeine Unknown     GI upset    Fentanyl Unknown and Other    Oxycodone Unknown    Verapamil Unknown   [2]   Past Medical History:  Diagnosis Date    Acute upper respiratory infection, unspecified     Upper respiratory infection of multiple sites    C. difficile diarrhea 11/28/2023    Chronic maxillary sinusitis     Sinusitis, maxillary, chronic    Fatigue 11/28/2023    Generalized abdominal pain 12/17/2020    Other specified disorders of bone, unspecified site     Exostosis    Other specified postprocedural states     History of cardiac cath    Personal history of diseases of the skin and subcutaneous tissue 04/16/2015    History of cyst of breast    Personal history of other diseases of the musculoskeletal system and connective tissue     History of myositis    Personal history of other diseases of the nervous system and sense organs     History of impacted cerumen    Personal history of other diseases of the respiratory system     History of acute pharyngitis    Personal history of other specified conditions     History of abnormal weight loss    Personal history of pneumonia (recurrent) 01/09/2018    History of pneumonia    Personal history of urinary (tract) infections     History of urinary tract infection    Right upper quadrant pain     Abdominal pain, RUQ (right upper quadrant)    Sprain of ligaments of thoracic spine, initial encounter     Thoracic sprain and strain    Unspecified abnormal findings in urine     Nonspecific findings on examination of urine   [3]   Past  Surgical History:  Procedure Laterality Date    APPENDECTOMY  04/15/2015    Appendectomy    CT GUIDED CHEST TUBE PLACEMENT  11/21/2017    CT GUIDED CHEST TUBE PLACEMENT 11/21/2017 [facility]    TONSILLECTOMY  04/15/2015    Tonsillectomy    US GUIDED THYROID BIOPSY  3/25/2022    US GUIDED THYROID BIOPSY 3/25/2022 [facility]   [4] No family history on file.  [5]   Social History  Socioeconomic History    Marital status:    Tobacco Use    Smoking status: Never     Passive exposure: Never    Smokeless tobacco: Never   Substance and Sexual Activity    Alcohol use: Not Currently    Drug use: Never     Social Drivers of Health     Financial Resource Strain: Low Risk  (11/9/2023)    Received from [facility]    Overall Financial Resource Strain (CARDIA)     Difficulty of Paying Living Expenses: Not hard at all   Food Insecurity: No Food Insecurity (11/9/2023)    Received from [facility]    Hunger Vital Sign     Worried About Running Out of Food in the Last Year: Never true     Ran Out of Food in the Last Year: Never true   Transportation Needs: Unknown (11/9/2023)    Received from [facility]    PRAPARE - Transportation     Lack of Transportation (Medical): No   Intimate Partner Violence: Not At Risk (3/31/2025)    Received from [email or web address]    Humiliation, Afraid, Rape, and Kick questionnaire     Fear of Current or Ex-Partner: No     Emotionally Abused: No     Physically Abused: No     Sexually Abused: No   Housing Stability: Unknown (11/9/2023)    Received from [facility]    Housing Stability Vital Sign     Unable to Pay for Housing in the Last Year: No     Unstable Housing in the Last Year: No

## 2025-06-25 PROBLEM — Z00.00 ANNUAL PHYSICAL EXAM: Status: ACTIVE | Noted: 2025-06-25

## 2025-06-25 LAB
25(OH)D3+25(OH)D2 SERPL-MCNC: 39 NG/ML (ref 30–100)
ALBUMIN SERPL-MCNC: 4.1 G/DL (ref 3.6–5.1)
ALP SERPL-CCNC: 70 U/L (ref 37–153)
ALT SERPL-CCNC: 18 U/L (ref 6–29)
ANION GAP SERPL CALCULATED.4IONS-SCNC: 14 MMOL/L (CALC) (ref 7–17)
APPEARANCE UR: CLEAR
AST SERPL-CCNC: 24 U/L (ref 10–35)
BACTERIA #/AREA URNS HPF: ABNORMAL /HPF
BASOPHILS # BLD AUTO: 51 CELLS/UL (ref 0–200)
BASOPHILS NFR BLD AUTO: 0.7 %
BILIRUB SERPL-MCNC: 0.3 MG/DL (ref 0.2–1.2)
BILIRUB UR QL STRIP: NEGATIVE
BUN SERPL-MCNC: 50 MG/DL (ref 7–25)
CALCIUM SERPL-MCNC: 8.1 MG/DL (ref 8.6–10.4)
CHLORIDE SERPL-SCNC: 94 MMOL/L (ref 98–110)
CHOLEST SERPL-MCNC: 95 MG/DL
CHOLEST/HDLC SERPL: 1.5 (CALC)
CO2 SERPL-SCNC: 22 MMOL/L (ref 20–32)
COLOR UR: YELLOW
CREAT SERPL-MCNC: 8.14 MG/DL (ref 0.6–0.95)
EGFRCR SERPLBLD CKD-EPI 2021: 4 ML/MIN/1.73M2
EOSINOPHIL # BLD AUTO: 110 CELLS/UL (ref 15–500)
EOSINOPHIL NFR BLD AUTO: 1.5 %
ERYTHROCYTE [DISTWIDTH] IN BLOOD BY AUTOMATED COUNT: 14.8 % (ref 11–15)
EST. AVERAGE GLUCOSE BLD GHB EST-MCNC: 82 MG/DL
EST. AVERAGE GLUCOSE BLD GHB EST-SCNC: 4.6 MMOL/L
GLUCOSE SERPL-MCNC: 116 MG/DL (ref 65–99)
GLUCOSE UR QL STRIP: ABNORMAL
HBA1C MFR BLD: 4.5 %
HCT VFR BLD AUTO: 41.3 % (ref 35–45)
HDLC SERPL-MCNC: 62 MG/DL
HGB BLD-MCNC: 13 G/DL (ref 11.7–15.5)
HGB UR QL STRIP: NEGATIVE
HYALINE CASTS #/AREA URNS LPF: ABNORMAL /LPF
KETONES UR QL STRIP: NEGATIVE
LDLC SERPL CALC-MCNC: 16 MG/DL (CALC)
LEUKOCYTE ESTERASE UR QL STRIP: NEGATIVE
LYMPHOCYTES # BLD AUTO: 1219 CELLS/UL (ref 850–3900)
LYMPHOCYTES NFR BLD AUTO: 16.7 %
MCH RBC QN AUTO: 31.6 PG (ref 27–33)
MCHC RBC AUTO-ENTMCNC: 31.5 G/DL (ref 32–36)
MCV RBC AUTO: 100.2 FL (ref 80–100)
MONOCYTES # BLD AUTO: 920 CELLS/UL (ref 200–950)
MONOCYTES NFR BLD AUTO: 12.6 %
NEUTROPHILS # BLD AUTO: 5001 CELLS/UL (ref 1500–7800)
NEUTROPHILS NFR BLD AUTO: 68.5 %
NITRITE UR QL STRIP: NEGATIVE
NONHDLC SERPL-MCNC: 33 MG/DL (CALC)
PH UR STRIP: 8 [PH] (ref 5–8)
PLATELET # BLD AUTO: 225 THOUSAND/UL (ref 140–400)
PMV BLD REES-ECKER: 10.2 FL (ref 7.5–12.5)
POTASSIUM SERPL-SCNC: 5.6 MMOL/L (ref 3.5–5.3)
PROT SERPL-MCNC: 5.8 G/DL (ref 6.1–8.1)
PROT UR QL STRIP: ABNORMAL
RBC # BLD AUTO: 4.12 MILLION/UL (ref 3.8–5.1)
RBC #/AREA URNS HPF: ABNORMAL /HPF
SERVICE CMNT-IMP: ABNORMAL
SODIUM SERPL-SCNC: 130 MMOL/L (ref 135–146)
SP GR UR STRIP: 1.01 (ref 1–1.03)
SQUAMOUS #/AREA URNS HPF: ABNORMAL /HPF
TRIGL SERPL-MCNC: 88 MG/DL
TSH SERPL-ACNC: 2.23 MIU/L (ref 0.4–4.5)
WBC # BLD AUTO: 7.3 THOUSAND/UL (ref 3.8–10.8)
WBC #/AREA URNS HPF: ABNORMAL /HPF

## 2025-06-25 PROCEDURE — G0179 MD RECERTIFICATION HHA PT: HCPCS | Performed by: INTERNAL MEDICINE

## 2025-06-25 ASSESSMENT — ENCOUNTER SYMPTOMS
SLEEP DISTURBANCE: 0
SINUS PRESSURE: 0
SHORTNESS OF BREATH: 0
BRUISES/BLEEDS EASILY: 0
EYE REDNESS: 0
OCCASIONAL FEELINGS OF UNSTEADINESS: 0
DEPRESSION: 0
HEADACHES: 0
LIGHT-HEADEDNESS: 0
VOMITING: 0
POLYPHAGIA: 0
DIARRHEA: 0
NUMBNESS: 0
FATIGUE: 0
ARTHRALGIAS: 0
BLOOD IN STOOL: 0
WEAKNESS: 0
NAUSEA: 0
CONSTIPATION: 0
FEVER: 0
LOSS OF SENSATION IN FEET: 0
CHILLS: 0
NERVOUS/ANXIOUS: 0
TROUBLE SWALLOWING: 0
COLOR CHANGE: 0
DYSPHORIC MOOD: 0
ABDOMINAL PAIN: 0
FLANK PAIN: 0
MYALGIAS: 0
ACTIVITY CHANGE: 0
CHEST TIGHTNESS: 0
DIZZINESS: 0
POLYDIPSIA: 0
UNEXPECTED WEIGHT CHANGE: 0
DIFFICULTY URINATING: 0
DYSURIA: 0
PALPITATIONS: 0
PHOTOPHOBIA: 0
JOINT SWELLING: 0
WHEEZING: 0
FREQUENCY: 0
EYE DISCHARGE: 0
COUGH: 0
SORE THROAT: 0
SINUS PAIN: 0

## 2025-07-14 PROBLEM — G47.34 IDIOPATHIC SLEEP RELATED NONOBSTRUCTIVE ALVEOLAR HYPOVENTILATION: Status: ACTIVE | Noted: 2025-07-14

## 2025-07-14 PROBLEM — N18.6 ESRD (END STAGE RENAL DISEASE) (MULTI): Status: ACTIVE | Noted: 2025-07-14

## 2025-07-14 PROBLEM — E55.9 VITAMIN D DEFICIENCY: Status: ACTIVE | Noted: 2025-07-14

## 2025-07-14 PROBLEM — C16.9 MALIGNANT NEOPLASM OF STOMACH, UNSPECIFIED LOCATION (MULTI): Status: ACTIVE | Noted: 2025-07-14

## 2025-07-14 PROBLEM — I50.32 CHRONIC DIASTOLIC CONGESTIVE HEART FAILURE: Status: ACTIVE | Noted: 2025-07-14

## 2025-08-01 ENCOUNTER — APPOINTMENT (OUTPATIENT)
Dept: PODIATRY | Facility: CLINIC | Age: 85
End: 2025-08-01
Payer: MEDICARE

## 2025-08-01 DIAGNOSIS — M79.674 TOE PAIN, RIGHT: ICD-10-CM

## 2025-08-01 DIAGNOSIS — M79.675 TOE PAIN, LEFT: ICD-10-CM

## 2025-08-01 DIAGNOSIS — B35.1 TINEA UNGUIUM: Primary | ICD-10-CM

## 2025-08-01 PROCEDURE — 1159F MED LIST DOCD IN RCRD: CPT | Performed by: PODIATRIST

## 2025-08-01 PROCEDURE — 1036F TOBACCO NON-USER: CPT | Performed by: PODIATRIST

## 2025-08-01 NOTE — PROGRESS NOTES
CC:  painful thickened and elongated toenails     HPI:  This presents complaining  painful thickened and elongated toenails that are difficult to manage.  Onset was gradual with worsening course until recently.  Aggravated by shoe gear and ambulation. Seen with daughter.        PCP: Dr. Bagley  Last visit: 6-24-25     PMH  Medical History           Past Medical History:   Diagnosis Date    Acute upper respiratory infection, unspecified       Upper respiratory infection of multiple sites    C. difficile diarrhea 11/28/2023    Chronic maxillary sinusitis       Sinusitis, maxillary, chronic    Fatigue 11/28/2023    Generalized abdominal pain 12/17/2020    Other specified disorders of bone, unspecified site       Exostosis    Other specified postprocedural states       History of cardiac cath    Personal history of diseases of the skin and subcutaneous tissue 04/16/2015     History of cyst of breast    Personal history of other diseases of the musculoskeletal system and connective tissue       History of myositis    Personal history of other diseases of the nervous system and sense organs       History of impacted cerumen    Personal history of other diseases of the respiratory system       History of acute pharyngitis    Personal history of other specified conditions       History of abnormal weight loss    Personal history of pneumonia (recurrent) 01/09/2018     History of pneumonia    Personal history of urinary (tract) infections       History of urinary tract infection    Right upper quadrant pain       Abdominal pain, RUQ (right upper quadrant)    Sprain of ligaments of thoracic spine, initial encounter       Thoracic sprain and strain    Unspecified abnormal findings in urine       Nonspecific findings on examination of urine         MEDS     Current Medications      Current Outpatient Medications:     carvedilol (Coreg) 12.5 mg tablet, Take 1 tablet (12.5 mg) by mouth 2 times a day with meals., Disp: , Rfl:      cloNIDine (Catapres) 0.1 mg tablet, Take 1 tablet (0.1 mg) by mouth if needed for high blood pressure. Take if BP exceeds 160, Disp: , Rfl:     dapagliflozin propanediol (Farxiga) 5 mg, Take 1 tablet (5 mg) by mouth once daily., Disp: , Rfl:     doxazosin (Cardura) 1 mg tablet, Take 1 tablet (1 mg) by mouth once daily at bedtime., Disp: 100 tablet, Rfl: 1    escitalopram (Lexapro) 10 mg tablet, Take 1 tablet (10 mg) by mouth once daily., Disp: 100 tablet, Rfl: 1    furosemide (Lasix) 20 mg tablet, Take 1 tablet (20 mg) by mouth once daily., Disp: , Rfl:     hydrALAZINE (Apresoline) 100 mg tablet, Take 1 tablet (100 mg) by mouth 3 times a day., Disp: , Rfl:     NIFEdipine ER (Adalat CC) 90 mg 24 hr tablet, Take 1 tablet (90 mg) by mouth once daily. Do not crush, chew, or split., Disp: , Rfl:     octreotide (SandoSTATIN) 50 mcg/mL solution injection, Inject 1 mL (50 mcg) under the skin every 30 (thirty) days., Disp: , Rfl:     sodium bicarbonate 650 mg tablet, Take 1 tablet (650 mg) by mouth 3 times a day., Disp: , Rfl:     spironolactone (Aldactone) 25 mg tablet, Take 1 tablet (25 mg) by mouth once daily., Disp: , Rfl:     valsartan (Diovan) 80 mg tablet, Take 1 tablet (80 mg) by mouth twice a day., Disp: , Rfl:       Allergies  Allergies             Allergies   Allergen Reactions    Codeine Unknown       GI upset    Fentanyl Unknown and Other    Oxycodone Unknown    Verapamil Unknown         Social History   Social History                Socioeconomic History    Marital status:        Spouse name: Not on file    Number of children: Not on file    Years of education: Not on file    Highest education level: Not on file   Occupational History    Not on file   Tobacco Use    Smoking status: Never       Passive exposure: Never    Smokeless tobacco: Never   Substance and Sexual Activity    Alcohol use: Not Currently    Drug use: Never    Sexual activity: Not on file   Other Topics Concern    Not on file   Social  History Narrative    Not on file      Social Determinants of Health              Financial Resource Strain: Low Risk  (11/9/2023)     Received from [facility]     Overall Financial Resource Strain (CARDIA)      Difficulty of Paying Living Expenses: Not hard at all   Food Insecurity: No Food Insecurity (11/9/2023)     Received from [facility]     Hunger Vital Sign      Worried About Running Out of Food in the Last Year: Never true      Ran Out of Food in the Last Year: Never true   Transportation Needs: Unknown (11/9/2023)     Received from [facility]     PRAPARE - Transportation      Lack of Transportation (Medical): No      Lack of Transportation (Non-Medical): Not on file   Physical Activity: Not on file   Stress: Not on file   Social Connections: Not on file   Intimate Partner Violence: Not on file   Housing Stability: Unknown (11/9/2023)     Received from [facility]     Housing Stability Vital Sign      Unable to Pay for Housing in the Last Year: No      Number of Places Lived in the Last Year: Not on file      Unstable Housing in the Last Year: No         Family History   No family history on file.      Surgical History             Past Surgical History:   Procedure Laterality Date    APPENDECTOMY   04/15/2015     Appendectomy    CT GUIDED CHEST TUBE PLACEMENT   11/21/2017     CT GUIDED CHEST TUBE PLACEMENT 11/21/2017 [facility]    TONSILLECTOMY   04/15/2015     Tonsillectomy    US GUIDED THYROID BIOPSY   3/25/2022     US GUIDED THYROID BIOPSY 3/25/2022 [facility]            REVIEW OF SYSTEMS     DERM:   + as noted in HPI.         Physical examination:   On General Observation: Patient is a pleasant, cooperative, well developed 83 y.o.  adult female. The patient is alert and oriented to time, place and person. Patient has normal affect and mood.  There were no vitals taken for this visit.     Vascular:  DP and PT pulses are 1-2/4 b/l.  mild edema noted. mild varicosities  b/l.  CFT  6 seconds to all digits bilateral.  Skin temperature is warm to warm from proximal to distal bilateral.       Muscular: Strength is 5/5 for all instrinsic and extrinsic muscle groups.      Neuro:  Proprioception present.   Sensation to vibration is  present. Protective sensation present  at all pedal sites via [place] 5.07 monofilament bilateral.  Light touch intact bilateral.      Derm:    Decreased hair growth b/l le  Left toenails: 1, 5 [place], crumbling upon debridement, subungual debris, elongation, mycotic appearance, tenderness, and thickness.   Right toenails: 1, 5 [place], crumbling upon debridement, subungual debris, elongation, mycotic appearance, tenderness, and thickness.         ASSESSMENT:    Tinea Unguium [B35.1]   Pain in right toe(s) [M79.674]   Pain in left toe(s) [M79.675]         PLAN:   Debrided nails 1-10 in length and height  Folloq up retouch laser.        Jeremiah Mitchell DPM

## 2025-08-03 DIAGNOSIS — R11.0 NAUSEA: ICD-10-CM

## 2025-08-05 RX ORDER — ONDANSETRON 4 MG/1
TABLET, ORALLY DISINTEGRATING ORAL
Qty: 90 TABLET | Refills: 1 | Status: SHIPPED | OUTPATIENT
Start: 2025-08-05

## 2025-09-18 ENCOUNTER — APPOINTMENT (OUTPATIENT)
Dept: PODIATRY | Facility: CLINIC | Age: 85
End: 2025-09-18
Payer: MEDICARE